# Patient Record
Sex: MALE | Race: WHITE | URBAN - METROPOLITAN AREA
[De-identification: names, ages, dates, MRNs, and addresses within clinical notes are randomized per-mention and may not be internally consistent; named-entity substitution may affect disease eponyms.]

---

## 2022-03-10 ENCOUNTER — OFFICE VISIT (OUTPATIENT)
Dept: PRIMARY CARE CLINIC | Age: 57
End: 2022-03-10
Payer: COMMERCIAL

## 2022-03-10 VITALS
HEIGHT: 69 IN | WEIGHT: 213 LBS | SYSTOLIC BLOOD PRESSURE: 138 MMHG | HEART RATE: 83 BPM | DIASTOLIC BLOOD PRESSURE: 86 MMHG | BODY MASS INDEX: 31.55 KG/M2 | TEMPERATURE: 98.1 F

## 2022-03-10 DIAGNOSIS — E11.69 DIABETES MELLITUS TYPE 2 IN OBESE (HCC): ICD-10-CM

## 2022-03-10 DIAGNOSIS — R53.83 FATIGUE, UNSPECIFIED TYPE: ICD-10-CM

## 2022-03-10 DIAGNOSIS — K20.90 BARRETT'S ESOPHAGUS WITH ESOPHAGITIS: ICD-10-CM

## 2022-03-10 DIAGNOSIS — E66.9 DIABETES MELLITUS TYPE 2 IN OBESE (HCC): ICD-10-CM

## 2022-03-10 DIAGNOSIS — Z00.00 ENCOUNTER FOR WELL ADULT EXAM WITHOUT ABNORMAL FINDINGS: Primary | ICD-10-CM

## 2022-03-10 DIAGNOSIS — K22.70 BARRETT'S ESOPHAGUS WITH ESOPHAGITIS: ICD-10-CM

## 2022-03-10 DIAGNOSIS — Z71.89 ACP (ADVANCE CARE PLANNING): ICD-10-CM

## 2022-03-10 PROBLEM — E78.1 HYPERTRIGLYCERIDEMIA: Status: ACTIVE | Noted: 2022-03-10

## 2022-03-10 LAB
A/G RATIO: 2.5 (ref 1.1–2.2)
ALBUMIN SERPL-MCNC: 5.2 G/DL (ref 3.4–5)
ALP BLD-CCNC: 75 U/L (ref 40–129)
ALT SERPL-CCNC: 61 U/L (ref 10–40)
ANION GAP SERPL CALCULATED.3IONS-SCNC: 15 MMOL/L (ref 3–16)
AST SERPL-CCNC: 32 U/L (ref 15–37)
BILIRUB SERPL-MCNC: 0.3 MG/DL (ref 0–1)
BUN BLDV-MCNC: 15 MG/DL (ref 7–20)
CALCIUM SERPL-MCNC: 10.4 MG/DL (ref 8.3–10.6)
CHLORIDE BLD-SCNC: 101 MMOL/L (ref 99–110)
CHOLESTEROL, TOTAL: 174 MG/DL (ref 0–199)
CO2: 24 MMOL/L (ref 21–32)
CREAT SERPL-MCNC: 0.8 MG/DL (ref 0.9–1.3)
CREATININE URINE: 211.9 MG/DL (ref 39–259)
GFR AFRICAN AMERICAN: >60
GFR NON-AFRICAN AMERICAN: >60
GLUCOSE BLD-MCNC: 202 MG/DL (ref 70–99)
HBA1C MFR BLD: 7.6 %
HBA1C MFR BLD: 7.6 %
HDLC SERPL-MCNC: 35 MG/DL (ref 40–60)
LDL CHOLESTEROL CALCULATED: ABNORMAL MG/DL
LDL CHOLESTEROL DIRECT: 91 MG/DL
MICROALBUMIN UR-MCNC: 1.7 MG/DL
MICROALBUMIN/CREAT UR-RTO: 8 MG/G (ref 0–30)
POTASSIUM SERPL-SCNC: 4.4 MMOL/L (ref 3.5–5.1)
SODIUM BLD-SCNC: 140 MMOL/L (ref 136–145)
TOTAL PROTEIN: 7.3 G/DL (ref 6.4–8.2)
TRIGL SERPL-MCNC: 452 MG/DL (ref 0–150)
VLDLC SERPL CALC-MCNC: ABNORMAL MG/DL

## 2022-03-10 PROCEDURE — 83036 HEMOGLOBIN GLYCOSYLATED A1C: CPT | Performed by: FAMILY MEDICINE

## 2022-03-10 PROCEDURE — 99386 PREV VISIT NEW AGE 40-64: CPT | Performed by: FAMILY MEDICINE

## 2022-03-10 PROCEDURE — 99204 OFFICE O/P NEW MOD 45 MIN: CPT | Performed by: FAMILY MEDICINE

## 2022-03-10 RX ORDER — OMEGA-3-ACID ETHYL ESTERS 1 G/1
CAPSULE, LIQUID FILLED ORAL
Qty: 360 CAPSULE | Refills: 3 | Status: SHIPPED | OUTPATIENT
Start: 2022-03-10 | End: 2022-10-04 | Stop reason: SDUPTHER

## 2022-03-10 RX ORDER — FENOFIBRATE 134 MG/1
CAPSULE ORAL
COMMUNITY
Start: 2022-01-18

## 2022-03-10 RX ORDER — CYCLOBENZAPRINE HCL 10 MG
20 TABLET ORAL 3 TIMES DAILY PRN
COMMUNITY

## 2022-03-10 RX ORDER — ALPRAZOLAM 0.5 MG/1
0.5 TABLET ORAL 3 TIMES DAILY PRN
COMMUNITY

## 2022-03-10 RX ORDER — OMEGA-3-ACID ETHYL ESTERS 1 G/1
CAPSULE, LIQUID FILLED ORAL
COMMUNITY
Start: 2022-01-17 | End: 2022-03-10 | Stop reason: SDUPTHER

## 2022-03-10 RX ORDER — LANSOPRAZOLE 30 MG/1
CAPSULE, DELAYED RELEASE ORAL
COMMUNITY
Start: 2022-03-01

## 2022-03-10 ASSESSMENT — PATIENT HEALTH QUESTIONNAIRE - PHQ9
SUM OF ALL RESPONSES TO PHQ QUESTIONS 1-9: 1
SUM OF ALL RESPONSES TO PHQ QUESTIONS 1-9: 1
SUM OF ALL RESPONSES TO PHQ9 QUESTIONS 1 & 2: 1
SUM OF ALL RESPONSES TO PHQ QUESTIONS 1-9: 1
SUM OF ALL RESPONSES TO PHQ QUESTIONS 1-9: 1
2. FEELING DOWN, DEPRESSED OR HOPELESS: 0
1. LITTLE INTEREST OR PLEASURE IN DOING THINGS: 1

## 2022-03-10 NOTE — PATIENT INSTRUCTIONS
Well Visit, Men 48 to 72: Care Instructions  Overview     Well visits can help you stay healthy. Your doctor has checked your overall health and may have suggested ways to take good care of yourself. Your doctor also may have recommended tests. At home, you can help prevent illness with healthy eating, regular exercise, and other steps. Follow-up care is a key part of your treatment and safety. Be sure to make and go to all appointments, and call your doctor if you are having problems. It's also a good idea to know your test results and keep a list of the medicines you take. How can you care for yourself at home? · Get screening tests that you and your doctor decide on. Screening helps find diseases before any symptoms appear. · Eat healthy foods. Choose fruits, vegetables, whole grains, protein, and low-fat dairy foods. Limit fat, especially saturated fat. Reduce salt in your diet. · Limit alcohol. Have no more than 2 drinks a day or 14 drinks a week. · Get at least 30 minutes of exercise on most days of the week. Walking is a good choice. You also may want to do other activities, such as running, swimming, cycling, or playing tennis or team sports. · Reach and stay at a healthy weight. This will lower your risk for many problems, such as obesity, diabetes, heart disease, and high blood pressure. · Do not smoke. Smoking can make health problems worse. If you need help quitting, talk to your doctor about stop-smoking programs and medicines. These can increase your chances of quitting for good. · Care for your mental health. It is easy to get weighed down by worry and stress. Learn strategies to manage stress, like deep breathing and mindfulness, and stay connected with your family and community. If you find you often feel sad or hopeless, talk with your doctor. Treatment can help. · Talk to your doctor about whether you have any risk factors for sexually transmitted infections (STIs).  You can help prevent STIs if you wait to have sex with a new partner (or partners) until you've each been tested for STIs. It also helps if you use condoms (male or female condoms) and if you limit your sex partners to one person who only has sex with you. Vaccines are available for some STIs. · If it's important to you to prevent pregnancy with your partner, talk with your doctor about birth control options that might be best for you. · If you think you may have a problem with alcohol or drug use, talk to your doctor. This includes prescription medicines (such as amphetamines and opioids) and illegal drugs (such as cocaine and methamphetamine). Your doctor can help you figure out what type of treatment is best for you. · Protect your skin from too much sun. When you're outdoors from 10 a.m. to 4 p.m., stay in the shade or cover up with clothing and a hat with a wide brim. Wear sunglasses that block UV rays. Even when it's cloudy, put broad-spectrum sunscreen (SPF 30 or higher) on any exposed skin. · See a dentist one or two times a year for checkups and to have your teeth cleaned. · Wear a seat belt in the car. When should you call for help? Watch closely for changes in your health, and be sure to contact your doctor if you have any problems or symptoms that concern you. Where can you learn more? Go to https://chlenoeb.[facility]. org and sign in to your [employer] account. Enter B978 in the [facility] box to learn more about \"Well Visit, Men 48 to 72: Care Instructions. \"     If you do not have an account, please click on the \"Sign Up Now\" link. Current as of: October 6, 2021               Content Version: 13.1  © 5848-4469 Healthwise, Incorporated. Care instructions adapted under license by [facility] ([facility]).  If you have questions about a medical condition or this instruction, always ask your healthcare professional. [address]  any warranty or liability for your use of this information. WELL ADULT LIFESTYLE INSTRUCTIONS:    Neema Lucero a day in the next week to spend an hour reviewing the information below then:     1) determine your health goals for the year   2) determine what changes you need to achieve those goals   3) design your daily routine, shopping habits etc to implement those changes        Default Right Action (no choices)       Make it EASY to do the RIGHT THINGS. 4) I invite you to send me your plans via [employer] so I can continue to help you       with them    Examine your lifestyle with an emphasis on BARRIERS to bad and good habits and how you can design your life to make better choices. If you want to feel better these are the FUNDAMENTAL PILLARS of Wellness:    1)  You can choose to Get 150 min/week of moderate exercise (can talk but can't        sing) or 75 min/week of vigorous exercise (can't talk). This will enhance your sense of well being (Exercise is as good as medicine for   depression.)    2)  You can choose to Get 7-9 hours of sleep per night    Detoxifies your brain, reduces risk of dementia    3)  You can choose to Strength Train 2 x a week on non-consecutive days   This will improve function and reduce risk of injury. Body weight type exercises   such as Yoga and Pilates are excellent choices. 4)  You can choose Good Nutrition. Only eat your goal weight (in lbs) x 10        calories/day and get 5 servings of Vegetables/day   Plant based diets reduce risk of heart attack/stroke and will help you feel full on   less food. Avoid highly processed foods and processed carbohydrates. 5)  You can choose Moderate alcohol intake < 1-2 drinks/day   Alcohol will disrupt your sleep and add calories to your day. 6)  You can choose to Develop a Charismatic/Supportive relationship. This will strengthen your resilience for the ups and downs. 7)  You can choose to Practice Mindfulness.      An hour a day of prayer/meditation/gratitude will change your life! If you are trying to lose weight, here are some recommendations for weight loss:  Not every weight loss program is appropriate for everybody. ..  good online sources include Noom (more social with daily check ins), Lifesum (similar but less social) and Naturally slim, as well as Brandneu ($1500)    The GI Diet or \"Primal diet\", Intermittent fasting can also be effective choices. If you have diabetes treated with insulin be sure to ask for specific guidance around meals. Take your desired weight in pounds and multiply by 10 and that is your average daily calorie allowance. For example if you wish to weigh 170 lb x 10 = 1700 gita/day (this is how to gradually lose the weight and maintain your desired weight). Avoid soda/coke and all \"wet carbs\" => Drink ice water instead    Drink a large glass of ice water before meals and EAT SLOWLY (talk while you eat)! Rethink your hunger => it means your losing weight.     Minimize highly processed carbohydrates as they stimulate your appetite:  Specifically cut back on Bread, Rice, Pasta and Potatoes    Avoid eating calories after 6 pm

## 2022-03-10 NOTE — PROGRESS NOTES
Well Adult Note  Name: Omi Patterson Date: 3/10/2022   MRN: <[id number]> Sex: Male   Age: 61 y.o. Ethnicity: Non- / Non    : 1965 Race: White (non-)      Kaaren Riding is here for well adult exam.  History:  Well adult exam, diabetes, fatigue and Harris's esophagus. Shonda Rogers notes that he does not do much exercise. He gets 7 hours of sleep a night. He drinks about 10 alcoholic beverages scattered over the week and eats about 2 servings of vegetables a day. He recently moved here from [facility] and is working at [employer] as a .    He tells me he has a history of diabetes. Would like to get tested for this. He is taking Metformin without side effect. He reports he has a history of Harris's esophagus. Tells me his last EGD was . He has been taking Prevacid twice a day for this. He notes he does snore and he has some evening fatigue and difficulty staying awake. Review of Systems    No Known Allergies    Prior to Visit Medications    Medication Sig Taking? Authorizing Provider   fenofibrate micronized (LOFIBRA) 134 MG capsule   Historical Provider, MD   metFORMIN (GLUCOPHAGE) 500 MG tablet Take 500 mg by mouth 2 times daily (with meals)  Historical Provider, MD   lansoprazole (PREVACID) 30 MG delayed release capsule   Historical Provider, MD   cyclobenzaprine (FLEXERIL) 10 MG tablet Take 20 mg by mouth 3 times daily as needed  Historical Provider, MD   omega-3 acid ethyl esters (LOVAZA) 1 g capsule TAKE 2 CAPSULES BY MOUTH TWICE DAILY  Historical Provider, MD   ALPRAZolam (XANAX) 0.5 MG tablet Take 0.5 mg by mouth 3 times daily as needed.   Historical Provider, MD       Past Medical History:   Diagnosis Date    Allergic rhinitis     Harris's esophagus with esophagitis     Diabetes mellitus type 2 in obese ([address]     Hypertriglyceridemia        Past Surgical History:   Procedure Laterality Date    CHOLECYSTECTOMY  2009    FOOT FRACTURE SURGERY  HERNIA REPAIR  2015       Family History   Problem Relation Age of Onset    No Known Problems Mother     Other Father 39        heart disease    Thyroid Disease Sister        Social History     Tobacco Use    Smoking status: Former Smoker     Packs/day: 1.00     Years: 25.00     Pack years: 25.00     Quit date:      Years since quittin.1    Smokeless tobacco: Never Used   Vaping Use    Vaping Use: Never used   Substance Use Topics    Alcohol use: Yes     Alcohol/week: 10.0 standard drinks     Types: 10 Standard drinks or equivalent per week     Comment: a beer a night    Drug use: Yes     Types: Marijuana Gelene Chasten)     Comment: occasionally       Objective   /86 (Site: Left Upper Arm, Position: Sitting, Cuff Size: Large Adult)   Pulse 83   Temp 98.1 °F (36.7 °C) (Temporal)   Ht 5' 8.75\" (1.746 m)   Wt 213 lb (96.6 kg)   BMI 31.68 kg/m²   Wt Readings from Last 3 Encounters:   03/10/22 213 lb (96.6 kg)     There were no vitals filed for this visit. Physical Exam  Constitutional:       Appearance: He is well-developed. HENT:      Head: Normocephalic and atraumatic. Nose: Nose normal.      Mouth/Throat:      Pharynx: No oropharyngeal exudate. Comments: Class IV airway  Eyes:      General: No scleral icterus. Right eye: No discharge. Left eye: No discharge. Pupils: Pupils are equal, round, and reactive to light. Neck:      Thyroid: No thyromegaly. Comments: 18 inch neck  Cardiovascular:      Rate and Rhythm: Normal rate and regular rhythm. Pulses:           Dorsalis pedis pulses are 2+ on the right side and 2+ on the left side. Posterior tibial pulses are 2+ on the right side and 2+ on the left side. Heart sounds: Normal heart sounds. No murmur heard. No friction rub. No gallop. Comments: No Edema Lower Extremities  Pulmonary:      Effort: Pulmonary effort is normal.      Breath sounds: Normal breath sounds.  No wheezing or rales.   Abdominal:      General: Bowel sounds are normal. There is no distension. Palpations: Abdomen is soft. There is no hepatomegaly or splenomegaly. Tenderness: There is no abdominal tenderness. There is no guarding or rebound. Musculoskeletal:         General: No tenderness or deformity. Normal range of motion. Cervical back: Normal range of motion and neck supple. Lymphadenopathy:      Cervical: No cervical adenopathy. Skin:     General: Skin is warm and dry. Findings: No erythema or rash. Neurological:      Mental Status: He is alert. Cranial Nerves: No cranial nerve deficit. Sensory: No sensory deficit. Gait: Gait normal.      Comments: Foot Exam: Skin warm, dry and intact w/o callus or erythema. Sensation intact to 10gm monofilament     Psychiatric:         Speech: Speech normal.         Behavior: Behavior normal.           Assessment   Plan   1. ACP (advance care planning)  Counseled patient on living will and healthcare power of . Gave him form for [facility] ADVANCED CARE PLAN FACE TO [address] [facility], [address] [[id number]]    2. Encounter for well adult exam without abnormal findings  Appears well:  Counselled diet, development, anticipatory guidance and safety issues. 3. Diabetes mellitus type 2 in obese (HCC)  Hemoglobin A1c was 7.6 today. This is marginal control. Counseled patient on diet and exercise and continue meds. Recheck 3 months  - omega-3 acid ethyl esters (LOVAZA) 1 g capsule; TAKE 2 CAPSULES BY MOUTH TWICE DAILY  Dispense: 360 capsule; Refill: 3  - Lipid Panel; Future  - Comprehensive Metabolic Panel; Future  - POCT glycosylated hemoglobin (Hb A1C)  - Microalbumin / Creatinine Urine Ratio; Future  -  DIABETES FOOT EXAM    4. Harris's esophagus with esophagitis  We will consult Dr. Craig Fink for evaluation. He will continue his PPI therapy.   We are looking to confirm the diagnosis of Harris's esophagus  - Amb External Referral To Gastroenterology    5. Fatigue, unspecified type  Concerning for sleep apnea. We will refer to Dr. Mary Arguelles for evaluation  - Tosha Grant MD, Sleep Medicine, [facility]      Personalized Preventive Plan   Current Health Maintenance Status    There is no immunization history on file for this patient. Health Maintenance   Topic Date Due    Hepatitis C screen  Never done    COVID-19 Vaccine (1) Never done    Depression Screen  Never done    HIV screen  Never done    DTaP/Tdap/Td vaccine (1 - Tdap) Never done    Diabetes screen  Never done    Lipid screen  Never done    Colorectal Cancer Screen  Never done    Shingles Vaccine (1 of 2) Never done    Low dose CT lung screening  Never done    Flu vaccine (1) Never done    Hepatitis A vaccine  Aged Out    Hepatitis B vaccine  Aged Out    Hib vaccine  Aged Out    Meningococcal (ACWY) vaccine  Aged Out    Pneumococcal 0-64 years Vaccine  Aged Out     Recommendations for [place]ee[place]ee[place] Due: see orders and patient instructions/AVS.    Return in 3 months (on 6/10/2022). Advance Care Planning   Advanced Care Planning: Discussed the patients choices for care and treatment in case of a health event that adversely affects decision-making abilities. Also discussed the patients long-term treatment options. Reviewed with the patient the appropriate state-specific advance directive documents. Reviewed the process of designating a competent adult as an Agent (or -in-fact) that could take make health care decisions for the patient if incompetent. Patient was asked to complete the declaration forms, either acknowledge the forms by a public notary or an eligible witness and provide a signed copy to the practice office.   Time spent (minutes): 3 minutes

## 2022-03-14 ENCOUNTER — TELEPHONE (OUTPATIENT)
Dept: ORTHOPEDIC SURGERY | Age: 57
End: 2022-03-14

## 2022-03-14 NOTE — TELEPHONE ENCOUNTER
PA submitted via [facility] for Omega-3-acid Ethyl Esters 1GM capsules.   Key: [id number] - Rx #: [id number]    STATUS: AWAITING CLINICAL QUESTIONS

## 2022-05-09 NOTE — TELEPHONE ENCOUNTER
metFORMIN (GLUCOPHAGE) 500 MG tablet     [address] [facility] [phone]   [address] [place] [address] Austyn Whitt 56 [address]   Phone:  [phone]  Fax:  [phone]      Pt needs refill and would like a 90 day supply.

## 2022-05-09 NOTE — TELEPHONE ENCOUNTER
Medication:   Requested Prescriptions     Pending Prescriptions Disp Refills    metFORMIN (GLUCOPHAGE) 500 MG tablet 60 tablet      Sig: Take 1 tablet by mouth 2 times daily (with meals)       Last Filled:  Reported 03/10/22  Last appt: 3/10/2022   Next appt: 6/13/2022    Last Labs DM:   Lab Results   Component Value Date    LABA1C 7.6 03/10/2022

## 2022-06-13 ENCOUNTER — OFFICE VISIT (OUTPATIENT)
Dept: PRIMARY CARE CLINIC | Age: 57
End: 2022-06-13
Payer: COMMERCIAL

## 2022-06-13 VITALS
TEMPERATURE: 97.6 F | BODY MASS INDEX: 30.79 KG/M2 | DIASTOLIC BLOOD PRESSURE: 82 MMHG | HEART RATE: 76 BPM | SYSTOLIC BLOOD PRESSURE: 129 MMHG | WEIGHT: 207 LBS | OXYGEN SATURATION: 92 %

## 2022-06-13 DIAGNOSIS — L57.0 ACTINIC KERATOSIS: ICD-10-CM

## 2022-06-13 DIAGNOSIS — E11.69 DIABETES MELLITUS TYPE 2 IN OBESE (HCC): Primary | ICD-10-CM

## 2022-06-13 DIAGNOSIS — F41.9 ANXIETY: ICD-10-CM

## 2022-06-13 DIAGNOSIS — K22.70 BARRETT'S ESOPHAGUS WITH ESOPHAGITIS: ICD-10-CM

## 2022-06-13 DIAGNOSIS — R53.83 FATIGUE, UNSPECIFIED TYPE: ICD-10-CM

## 2022-06-13 DIAGNOSIS — E66.9 DIABETES MELLITUS TYPE 2 IN OBESE (HCC): Primary | ICD-10-CM

## 2022-06-13 DIAGNOSIS — K20.90 BARRETT'S ESOPHAGUS WITH ESOPHAGITIS: ICD-10-CM

## 2022-06-13 PROCEDURE — 3051F HG A1C>EQUAL 7.0%<8.0%: CPT | Performed by: FAMILY MEDICINE

## 2022-06-13 PROCEDURE — 96127 BRIEF EMOTIONAL/BEHAV ASSMT: CPT | Performed by: FAMILY MEDICINE

## 2022-06-13 PROCEDURE — 17000 DESTRUCT PREMALG LESION: CPT | Performed by: FAMILY MEDICINE

## 2022-06-13 PROCEDURE — 99214 OFFICE O/P EST MOD 30 MIN: CPT | Performed by: FAMILY MEDICINE

## 2022-06-13 RX ORDER — CITALOPRAM 40 MG/1
40 TABLET ORAL DAILY
Qty: 90 TABLET | Refills: 1 | Status: SHIPPED | OUTPATIENT
Start: 2022-06-13

## 2022-06-13 SDOH — ECONOMIC STABILITY: FOOD INSECURITY: WITHIN THE PAST 12 MONTHS, YOU WORRIED THAT YOUR FOOD WOULD RUN OUT BEFORE YOU GOT MONEY TO BUY MORE.: NEVER TRUE

## 2022-06-13 SDOH — ECONOMIC STABILITY: FOOD INSECURITY: WITHIN THE PAST 12 MONTHS, THE FOOD YOU BOUGHT JUST DIDN'T LAST AND YOU DIDN'T HAVE MONEY TO GET MORE.: NEVER TRUE

## 2022-06-13 ASSESSMENT — ANXIETY QUESTIONNAIRES
2. NOT BEING ABLE TO STOP OR CONTROL WORRYING: 0
6. BECOMING EASILY ANNOYED OR IRRITABLE: 0
4. TROUBLE RELAXING: 1
GAD7 TOTAL SCORE: 1
1. FEELING NERVOUS, ANXIOUS, OR ON EDGE: 0
IF YOU CHECKED OFF ANY PROBLEMS ON THIS QUESTIONNAIRE, HOW DIFFICULT HAVE THESE PROBLEMS MADE IT FOR YOU TO DO YOUR WORK, TAKE CARE OF THINGS AT HOME, OR GET ALONG WITH OTHER PEOPLE: NOT DIFFICULT AT ALL
7. FEELING AFRAID AS IF SOMETHING AWFUL MIGHT HAPPEN: 0
5. BEING SO RESTLESS THAT IT IS HARD TO SIT STILL: 0
3. WORRYING TOO MUCH ABOUT DIFFERENT THINGS: 0

## 2022-06-13 ASSESSMENT — SOCIAL DETERMINANTS OF HEALTH (SDOH): HOW HARD IS IT FOR YOU TO PAY FOR THE VERY BASICS LIKE FOOD, HOUSING, MEDICAL CARE, AND HEATING?: NOT HARD AT ALL

## 2022-06-13 NOTE — PATIENT INSTRUCTIONS
Carolina Marques 1999, [address] [facility] [address]  [place] [address]  Phone: [phone]  Fax: [phone]\"    [address]  Sleep Medicine - Mary Jacobsen MD   [place] [address] [place] [address]   Phone: [phone]

## 2022-06-13 NOTE — PROGRESS NOTES
Chief Complaint   Patient presents with    Diabetes    Fatigue       HPI: Dl Lopes  presents for evaluation and management of diabetes, varus esophagus, fatigue and skin lesion. He notes he is taking and tolerating his diabetes medications without side effects. He is working on some weight loss and has managed to affect a 6 pound weight loss in the last 3 months. He is taking and tolerating his medicine for his Harris's. Notes no reflux or heartburn. Denies dysphagia. He has not followed up with Dr. Familia Shah for repeat EGD and possible biopsy. He notes his fatigue is a little bit better. He notes if he gets to bed early enough he wakes up feeling rested usually. He has an occasional headache in the morning. He did not ask his wife if he stops breathing while sleeping. He also notes a scaly lesion on the right side of his face he would like me to look at           Review of Systems    No Known Allergies  New Prescriptions    CITALOPRAM (CELEXA) 40 MG TABLET    Take 1 tablet by mouth daily     Current Outpatient Medications   Medication Sig Dispense Refill    citalopram (CELEXA) 40 MG tablet Take 1 tablet by mouth daily 90 tablet 1    metFORMIN (GLUCOPHAGE) 500 MG tablet Take 1 tablet by mouth 2 times daily (with meals) 180 tablet 0    fenofibrate micronized (LOFIBRA) 134 MG capsule       lansoprazole (PREVACID) 30 MG delayed release capsule       ALPRAZolam (XANAX) 0.5 MG tablet Take 0.5 mg by mouth 3 times daily as needed.  omega-3 acid ethyl esters (LOVAZA) 1 g capsule TAKE 2 CAPSULES BY MOUTH TWICE DAILY 360 capsule 3    cyclobenzaprine (FLEXERIL) 10 MG tablet Take 20 mg by mouth 3 times daily as needed (Patient not taking: Reported on 6/13/2022)       No current facility-administered medications for this visit.        Past Medical History:   Diagnosis Date    Allergic rhinitis     Harris's esophagus with esophagitis     Diabetes mellitus type 2 in obese ([address]     Hypertriglyceridemia          Objective   /82   Pulse 76   Temp 97.6 °F (36.4 °C)   Wt 207 lb (93.9 kg)   SpO2 92%   BMI 30.79 kg/m²   Wt Readings from Last 3 Encounters:   06/13/22 207 lb (93.9 kg)   03/10/22 213 lb (96.6 kg)       Physical Exam  Constitutional:       Appearance: He is well-developed. Cardiovascular:      Rate and Rhythm: Normal rate and regular rhythm. Heart sounds: No murmur heard. No friction rub. No gallop. Pulmonary:      Effort: Pulmonary effort is normal.      Breath sounds: Normal breath sounds. No wheezing or rales. Abdominal:      General: Bowel sounds are normal. There is no distension. Palpations: Abdomen is soft. There is no mass. Tenderness: There is no abdominal tenderness. Skin:     General: Skin is warm and dry. Findings: No rash. Comments: Hyperkeratotic scaly 5 mm plaque like lesion on right cheek. There is no induration. No scab           Chemistry        Component Value Date/Time     03/10/2022 0828    K 4.4 03/10/2022 0828     03/10/2022 0828    CO2 24 03/10/2022 0828    BUN 15 03/10/2022 0828    CREATININE 0.8 (L) 03/10/2022 0828        Component Value Date/Time    CALCIUM 10.4 03/10/2022 0828    ALKPHOS 75 03/10/2022 0828    AST 32 03/10/2022 0828    ALT 61 (H) 03/10/2022 0828    BILITOT 0.3 03/10/2022 0828          No results found for: WBC, HGB, HCT, MCV, PLT  Lab Results   Component Value Date    LABA1C 7.6 03/10/2022     No results found for: EAG  Lab Results   Component Value Date    LABA1C 7.6 03/10/2022     No components found for: CHLPL  Lab Results   Component Value Date    TRIG 452 (H) 03/10/2022     Lab Results   Component Value Date    HDL 35 (L) 03/10/2022     Lab Results   Component Value Date    LDLCALC see below 03/10/2022     Lab Results   Component Value Date    LABVLDL see below 03/10/2022         Assessment   Plan   1.  Diabetes mellitus type 2 in obese ([address]  Marginal control: Praised patient's weight loss. Expect him to be moving in right direction. We will defer A1c screening today but recheck him in 3 months    2. Harris's esophagus with esophagitis  Counseled patient on importance of follow-up for EGD as Harris's is a precancerous disease    3. Fatigue, unspecified type  Encouraged patient to get sleep evaluation. He is deferring. Working on weight loss in the meantime which may help    4. Anxiety  Chronic problem. Well-controlled with Celexa. Follow-up in 3 months  - citalopram (CELEXA) 40 MG tablet; Take 1 tablet by mouth daily  Dispense: 90 tablet; Refill: 1  - [place] BEHAV ASSMT W/SCORE & DOCD/STAND INSTRUMENT    5. Actinic keratosis  After discussing risks benefits and options, destroyed with liquid nitrogen. Follow-up 3 months  - [address] - [place] DESTRUC PREMALIGNANT, FIRST LESION        RTC Return in about 3 months (around 9/13/2022).

## 2022-08-25 NOTE — TELEPHONE ENCOUNTER
Medication:   Requested Prescriptions     Pending Prescriptions Disp Refills    metFORMIN (GLUCOPHAGE) 500 MG tablet 180 tablet 0     Sig: Take 1 tablet by mouth 2 times daily (with meals)        Last Filled:  5/9/22     Patient Phone Number: [phone] (home)     Last appt: 6/13/2022   Next appt: 9/13/2022    Last OARRS: No flowsheet data found.

## 2022-08-29 ENCOUNTER — TELEPHONE (OUTPATIENT)
Dept: PRIMARY CARE CLINIC | Age: 57
End: 2022-08-29

## 2022-08-29 DIAGNOSIS — E66.9 DIABETES MELLITUS TYPE 2 IN OBESE (HCC): Primary | ICD-10-CM

## 2022-08-29 DIAGNOSIS — E78.1 HYPERTRIGLYCERIDEMIA: ICD-10-CM

## 2022-08-29 DIAGNOSIS — E11.69 DIABETES MELLITUS TYPE 2 IN OBESE (HCC): Primary | ICD-10-CM

## 2022-08-29 NOTE — TELEPHONE ENCOUNTER
----- Message from Emily Hernandez sent at 8/29/2022  1:34 PM EDT -----  Subject: Referral Request    Reason for referral request? Labs  Provider patient wants to be referred to(if known):     Provider Phone Number(if known): Additional Information for Provider? Braden Freeman is schedule for appt   with PCP on 9/13/22. He would like to be able to complete his labs by this   Friday 9/2/22 because he will be out of town.  Please f/u with him as soon   as possible.  ---------------------------------------------------------------------------  --------------  Clayton BEJARANO    [phone]; OK to leave message on voicemail, OK to respond with   electronic message via [employer] portal (only for patients who have   registered [employer] account)  ---------------------------------------------------------------------------  --------------

## 2022-09-02 DIAGNOSIS — E66.9 DIABETES MELLITUS TYPE 2 IN OBESE (HCC): ICD-10-CM

## 2022-09-02 DIAGNOSIS — E11.69 DIABETES MELLITUS TYPE 2 IN OBESE (HCC): ICD-10-CM

## 2022-09-02 DIAGNOSIS — E78.1 HYPERTRIGLYCERIDEMIA: ICD-10-CM

## 2022-09-02 LAB
A/G RATIO: 1.9 (ref 1.1–2.2)
ALBUMIN SERPL-MCNC: 4.8 G/DL (ref 3.4–5)
ALP BLD-CCNC: 90 U/L (ref 40–129)
ALT SERPL-CCNC: 58 U/L (ref 10–40)
ANION GAP SERPL CALCULATED.3IONS-SCNC: 15 MMOL/L (ref 3–16)
AST SERPL-CCNC: 33 U/L (ref 15–37)
BILIRUB SERPL-MCNC: 0.3 MG/DL (ref 0–1)
BUN BLDV-MCNC: 13 MG/DL (ref 7–20)
CALCIUM SERPL-MCNC: 10.1 MG/DL (ref 8.3–10.6)
CHLORIDE BLD-SCNC: 99 MMOL/L (ref 99–110)
CHOLESTEROL, TOTAL: 173 MG/DL (ref 0–199)
CO2: 24 MMOL/L (ref 21–32)
CREAT SERPL-MCNC: 0.8 MG/DL (ref 0.9–1.3)
GFR AFRICAN AMERICAN: >60
GFR NON-AFRICAN AMERICAN: >60
GLUCOSE BLD-MCNC: 284 MG/DL (ref 70–99)
HDLC SERPL-MCNC: 31 MG/DL (ref 40–60)
LDL CHOLESTEROL CALCULATED: ABNORMAL MG/DL
LDL CHOLESTEROL DIRECT: 82 MG/DL
POTASSIUM SERPL-SCNC: 4.6 MMOL/L (ref 3.5–5.1)
SODIUM BLD-SCNC: 138 MMOL/L (ref 136–145)
TOTAL PROTEIN: 7.3 G/DL (ref 6.4–8.2)
TRIGL SERPL-MCNC: 612 MG/DL (ref 0–150)
VLDLC SERPL CALC-MCNC: ABNORMAL MG/DL

## 2022-09-03 LAB
ESTIMATED AVERAGE GLUCOSE: 217.3 MG/DL
HBA1C MFR BLD: 9.2 %

## 2022-09-06 NOTE — RESULT ENCOUNTER NOTE
Good Morning Jamie Tran ,    Thank you for getting your labs drawn in preparation for our visit. Your lab results are back and it appears your diabetes control has slipped a little bit. We will be addressing this when you come in.       I look forward to seeing you in a few days,    Dr. Hipolito Johns      For your convenience I have listed your future appointment(s) below:  Future Appointments  9/13/2022  9:15 AM    MD RADHA Nolan

## 2022-09-13 ENCOUNTER — OFFICE VISIT (OUTPATIENT)
Dept: PRIMARY CARE CLINIC | Age: 57
End: 2022-09-13
Payer: COMMERCIAL

## 2022-09-13 VITALS
HEART RATE: 91 BPM | WEIGHT: 204 LBS | DIASTOLIC BLOOD PRESSURE: 88 MMHG | HEIGHT: 68 IN | OXYGEN SATURATION: 98 % | SYSTOLIC BLOOD PRESSURE: 136 MMHG | TEMPERATURE: 97.7 F | BODY MASS INDEX: 30.92 KG/M2

## 2022-09-13 DIAGNOSIS — E11.69 DIABETES MELLITUS TYPE 2 IN OBESE (HCC): ICD-10-CM

## 2022-09-13 DIAGNOSIS — Z12.11 SCREEN FOR COLON CANCER: ICD-10-CM

## 2022-09-13 DIAGNOSIS — E78.1 HYPERTRIGLYCERIDEMIA: ICD-10-CM

## 2022-09-13 DIAGNOSIS — Z00.00 ENCOUNTER FOR WELL ADULT EXAM WITHOUT ABNORMAL FINDINGS: ICD-10-CM

## 2022-09-13 DIAGNOSIS — E66.9 DIABETES MELLITUS TYPE 2 IN OBESE (HCC): ICD-10-CM

## 2022-09-13 DIAGNOSIS — K20.90 BARRETT'S ESOPHAGUS WITH ESOPHAGITIS: ICD-10-CM

## 2022-09-13 DIAGNOSIS — Z00.00 WELL ADULT EXAM: Primary | ICD-10-CM

## 2022-09-13 DIAGNOSIS — K22.70 BARRETT'S ESOPHAGUS WITH ESOPHAGITIS: ICD-10-CM

## 2022-09-13 PROCEDURE — 99396 PREV VISIT EST AGE 40-64: CPT | Performed by: FAMILY MEDICINE

## 2022-09-13 PROCEDURE — 99214 OFFICE O/P EST MOD 30 MIN: CPT | Performed by: FAMILY MEDICINE

## 2022-09-13 RX ORDER — ATORVASTATIN CALCIUM 40 MG/1
40 TABLET, FILM COATED ORAL DAILY
Qty: 90 TABLET | OUTPATIENT
Start: 2022-09-13

## 2022-09-13 RX ORDER — ATORVASTATIN CALCIUM 40 MG/1
40 TABLET, FILM COATED ORAL DAILY
Qty: 30 TABLET | Refills: 3 | Status: SHIPPED | OUTPATIENT
Start: 2022-09-13

## 2022-09-13 ASSESSMENT — PATIENT HEALTH QUESTIONNAIRE - PHQ9
SUM OF ALL RESPONSES TO PHQ QUESTIONS 1-9: 0
2. FEELING DOWN, DEPRESSED OR HOPELESS: 0
1. LITTLE INTEREST OR PLEASURE IN DOING THINGS: 0
SUM OF ALL RESPONSES TO PHQ QUESTIONS 1-9: 0
SUM OF ALL RESPONSES TO PHQ9 QUESTIONS 1 & 2: 0

## 2022-09-13 NOTE — PROGRESS NOTES
Well Adult Note  Name: Марина Dubois Date: 2022   MRN: [id number] Sex: Male   Age: 61 y.o. Ethnicity: Non- / Non    : 1965 Race: White (non-)      Angella Camacho is here for well adult exam.  History:  He notes he does not do any formal exercises but is currently rehabbing a house. He gets about 7 hours of sleep at night. He eats about 1 serving of alcohol a day and 2-3 servings of vegetables a day. He notes he has been taking and tolerating his metformin for his diabetes. Admits he has been drinking vitamin water for about 200 gita a day that he thinks is coming up his blood sugar. He is taking and tolerating his fish oil and fenofibrate for his triglycerides. He has yet to follow-up with Dr. Christian Dixon for his colonoscopy or his EGD for his history of Harris's esophagus. He is compliant with his Prevacid for that      Review of Systems    No Known Allergies      Prior to Visit Medications    Medication Sig Taking? Authorizing Provider   metFORMIN (GLUCOPHAGE) 500 MG tablet Take 1 tablet by mouth 2 times daily (with meals) Yes Arabella Vargas MD   fenofibrate micronized (LOFIBRA) 134 MG capsule  Yes Historical Provider, MD   lansoprazole (PREVACID) 30 MG delayed release capsule  Yes Historical Provider, MD   ALPRAZolam Hussein Paci) 0.5 MG tablet Take 0.5 mg by mouth 3 times daily as needed.  Yes Historical Provider, MD   omega-3 acid ethyl esters (LOVAZA) 1 g capsule TAKE 2 CAPSULES BY MOUTH TWICE DAILY Yes Arabella Vargas MD   citalopram (CELEXA) 40 MG tablet Take 1 tablet by mouth daily  Patient not taking: Reported on 2022  Arabella Vargas MD   cyclobenzaprine (FLEXERIL) 10 MG tablet Take 20 mg by mouth 3 times daily as needed  Patient not taking: No sig reported  Historical Provider, MD         Past Medical History:   Diagnosis Date    Allergic rhinitis     Harris's esophagus with esophagitis     Diabetes mellitus type 2 in obese ([address] Hypertriglyceridemia        Past Surgical History:   Procedure Laterality Date    CHOLECYSTECTOMY  2009    FOOT FRACTURE SURGERY      HERNIA REPAIR  2015         Family History   Problem Relation Age of Onset    No Known Problems Mother     Other Father 39        heart disease    Thyroid Disease Sister        Social History     Tobacco Use    Smoking status: Former     Packs/day: 1.00     Years: 25.00     Pack years: 25.00     Types: Cigarettes     Quit date:      Years since quittin.7    Smokeless tobacco: Never   Vaping Use    Vaping Use: Never used   Substance Use Topics    Alcohol use: Yes     Alcohol/week: 10.0 standard drinks     Types: 10 Standard drinks or equivalent per week     Comment: a beer a night    Drug use: Yes     Types: Marijuana (Weed)     Comment: occasionally       Objective   /88   Pulse 91   Temp 97.7 °F (36.5 °C)   Ht 5' 8\" (1.727 m)   Wt 204 lb (92.5 kg)   SpO2 98%   BMI 31.02 kg/m²   Wt Readings from Last 3 Encounters:   22 204 lb (92.5 kg)   22 207 lb (93.9 kg)   03/10/22 213 lb (96.6 kg)     There were no vitals filed for this visit. Physical Exam  Constitutional:       Appearance: He is well-developed. HENT:      Head: Normocephalic and atraumatic. Nose: Nose normal.      Mouth/Throat:      Pharynx: No oropharyngeal exudate. Eyes:      General: No scleral icterus. Right eye: No discharge. Left eye: No discharge. Pupils: Pupils are equal, round, and reactive to light. Neck:      Thyroid: No thyromegaly. Cardiovascular:      Rate and Rhythm: Normal rate and regular rhythm. Pulses:           Dorsalis pedis pulses are 2+ on the right side and 2+ on the left side. Posterior tibial pulses are 2+ on the right side and 2+ on the left side. Heart sounds: Normal heart sounds. No murmur heard. No friction rub. No gallop.       Comments: No Edema Lower Extremities  Pulmonary:      Effort: Pulmonary effort is normal.      Breath sounds: Normal breath sounds. No wheezing or rales. Abdominal:      General: Bowel sounds are normal. There is no distension. Palpations: Abdomen is soft. There is no hepatomegaly or splenomegaly. Tenderness: There is no abdominal tenderness. There is no guarding or rebound. Musculoskeletal:         General: No tenderness or deformity. Normal range of motion. Cervical back: Normal range of motion and neck supple. Lymphadenopathy:      Cervical: No cervical adenopathy. Skin:     General: Skin is warm and dry. Findings: No erythema or rash. Neurological:      Mental Status: He is alert. Cranial Nerves: No cranial nerve deficit. Sensory: No sensory deficit. Gait: Gait normal.   Psychiatric:         Speech: Speech normal.         Behavior: Behavior normal.         Assessment   Plan   1. Well adult exam  Appears well:  Counselled diet, development, anticipatory guidance and safety issues. 2. Diabetes mellitus type 2 in obese [facility])  Uncontrolled: Counseled patient on working on diet and exercise to manage his blood sugars in addition to medications. He will cut out the refined sugars and we will follow-up in 3 months    3. Hypertriglyceridemia  Uncontrolled: Counseled patient on risk of pancreatitis. We will continue treating with current meds and add on Lipitor daily. 4. Harris's esophagus with esophagitis  Continue meds and encourage patient to follow-up with Dr. Dio Jose for this and his colonoscopy    5. Encounter for well adult exam without abnormal findings  As above      Personalized Preventive Plan   Current Health Maintenance Status    There is no immunization history on file for this patient.      Health Maintenance   Topic Date Due    COVID-19 Vaccine (1) Never done    Pneumococcal 0-64 years Vaccine (1 - PCV) Never done    HIV screen  Never done    Diabetic retinal exam  Never done    Hepatitis C screen  Never done    Hepatitis B vaccine (1 of 3 - Risk 3-dose series) Never done    DTaP/Tdap/Td vaccine (1 - Tdap) Never done    Colorectal Cancer Screen  Never done    Low dose CT lung screening  Never done    Flu vaccine (1) Never done    Shingles vaccine (2 of 2) 10/19/2022    A1C test (Diabetic or Prediabetic)  12/02/2022    Diabetic foot exam  03/10/2023    Diabetic microalbuminuria test  03/10/2023    Depression Screen  03/10/2023    Lipids  09/02/2023    Hepatitis A vaccine  Aged Out    Hib vaccine  Aged Out    Meningococcal (ACWY) vaccine  Aged Out     Recommendations for CRMnext Due: see orders and patient instructions/AVS.    No follow-ups on file. Advance Care Planning   Advanced Care Planning: Discussed the patients choices for care and treatment in case of a health event that adversely affects decision-making abilities. Also discussed the patients long-term treatment options. Reviewed with the patient the appropriate state-specific advance directive documents. Reviewed the process of designating a competent adult as an Agent (or -in-fact) that could take make health care decisions for the patient if incompetent. Patient was asked to complete the declaration forms, either acknowledge the forms by a public notary or an eligible witness and provide a signed copy to the practice office.   Time spent (minutes): 2 minutes

## 2022-09-13 NOTE — PATIENT INSTRUCTIONS
Niacin    Send me your Completed Living Will and/or [facility]  documents:  [address] MD  [address] [facility], [address], [address]  Ph: [phone]  Fax: [address] [facility] [place], [address] [address]  Phone: [phone]  Fax: [phone]\"    WELL ADULT LIFESTYLE INSTRUCTIONS:    Bryanna Castillo a day in the next week to spend an hour reviewing the information below then:     1) determine your health goals for the year   2) determine what changes you need to achieve those goals   3) design your daily routine, shopping habits etc to implement those changes        Default Right Action (no choices)       Make it EASY to do the RIGHT THINGS. 4) I invite you to send me your plans via [employer] so I can continue to help you       with them    Examine your lifestyle with an emphasis on BARRIERS to bad and good habits and how you can design your life to make better choices. If you want to feel better these are the FUNDAMENTAL PILLARS of Wellness:    1)  You can choose to Get 150 min/week of moderate exercise (can talk but can't        sing) or 75 min/week of vigorous exercise (can't talk). This will enhance your sense of well being (Exercise is as good as medicine for   depression.)    2)  You can choose to Get 7-9 hours of sleep per night    Detoxifies your brain, reduces risk of dementia    3)  You can choose to Strength Train 2 x a week on non-consecutive days   This will improve function and reduce risk of injury. Body weight type exercises   such as Yoga and Pilates are excellent choices. 4)  You can choose Good Nutrition. Only eat your goal weight (in lbs) x 10        calories/day and get 5 servings of Vegetables/day   Plant based diets reduce risk of heart attack/stroke and will help you feel full on   less food.    Avoid highly processed foods and processed carbohydrates. 5)  You can choose Moderate alcohol intake < 1-2 drinks/day   Alcohol will disrupt your sleep and add calories to your day. 6)  You can choose to Develop a Charismatic/Supportive relationship. This will strengthen your resilience for the ups and downs. 7)  You can choose to Practice Mindfulness. An hour a day of prayer/meditation/gratitude will change your life! If you are trying to lose weight, here are some recommendations for weight loss:  Not every weight loss program is appropriate for everybody. ..  good online sources include [employer] (more social with daily check ins), Marley Spoon (similar but less social) and Naturally slim, as well as [employer] ($1500)    The GI Diet or \"Primal diet\", Intermittent fasting can also be effective choices. If you have diabetes treated with insulin be sure to ask for specific guidance around meals. Take your desired weight in pounds and multiply by 10 and that is your average daily calorie allowance. For example if you wish to weigh 170 lb x 10 = 1700 gita/day (this is how to gradually lose the weight and maintain your desired weight). Avoid soda/coke and all \"wet carbs\" => Drink ice water instead    Drink a large glass of ice water before meals and EAT SLOWLY (talk while you eat)! Rethink your hunger => it means your losing weight.     Minimize highly processed carbohydrates as they stimulate your appetite:  Specifically cut back on Bread, Rice, Pasta and Potatoes    Avoid eating calories after 6 pm

## 2022-10-04 DIAGNOSIS — E11.69 DIABETES MELLITUS TYPE 2 IN OBESE (HCC): ICD-10-CM

## 2022-10-04 DIAGNOSIS — E66.9 DIABETES MELLITUS TYPE 2 IN OBESE (HCC): ICD-10-CM

## 2022-10-04 NOTE — TELEPHONE ENCOUNTER
Medication:   Requested Prescriptions     Pending Prescriptions Disp Refills    omega-3 acid ethyl esters (LOVAZA) 1 g capsule 360 capsule 3     Sig: TAKE 2 CAPSULES BY MOUTH TWICE DAILY        Last Filled:  03/10/22    Patient Phone Number: [phone] (home)     Last appt: 9/13/2022   Next appt: 12/13/2022    Last OARRS: No flowsheet data found.

## 2022-10-05 RX ORDER — OMEGA-3-ACID ETHYL ESTERS 1 G/1
CAPSULE, LIQUID FILLED ORAL
Qty: 360 CAPSULE | Refills: 3 | Status: SHIPPED | OUTPATIENT
Start: 2022-10-05

## 2022-11-07 ENCOUNTER — OFFICE VISIT (OUTPATIENT)
Dept: PRIMARY CARE CLINIC | Age: 57
End: 2022-11-07
Payer: COMMERCIAL

## 2022-11-07 VITALS
HEIGHT: 68 IN | DIASTOLIC BLOOD PRESSURE: 70 MMHG | WEIGHT: 200.8 LBS | SYSTOLIC BLOOD PRESSURE: 122 MMHG | BODY MASS INDEX: 30.43 KG/M2 | OXYGEN SATURATION: 98 % | TEMPERATURE: 97.2 F | HEART RATE: 78 BPM

## 2022-11-07 DIAGNOSIS — E11.69 DIABETES MELLITUS TYPE 2 IN OBESE (HCC): ICD-10-CM

## 2022-11-07 DIAGNOSIS — R73.09 BLOOD SUGAR INCREASED: Primary | ICD-10-CM

## 2022-11-07 DIAGNOSIS — K22.70 BARRETT'S ESOPHAGUS WITH ESOPHAGITIS: ICD-10-CM

## 2022-11-07 DIAGNOSIS — E78.1 HYPERTRIGLYCERIDEMIA: ICD-10-CM

## 2022-11-07 DIAGNOSIS — K20.90 BARRETT'S ESOPHAGUS WITH ESOPHAGITIS: ICD-10-CM

## 2022-11-07 DIAGNOSIS — E66.9 DIABETES MELLITUS TYPE 2 IN OBESE (HCC): ICD-10-CM

## 2022-11-07 LAB — HBA1C MFR BLD: 10.2 %

## 2022-11-07 PROCEDURE — 90677 PCV20 VACCINE IM: CPT | Performed by: FAMILY MEDICINE

## 2022-11-07 PROCEDURE — 99214 OFFICE O/P EST MOD 30 MIN: CPT | Performed by: FAMILY MEDICINE

## 2022-11-07 PROCEDURE — 3046F HEMOGLOBIN A1C LEVEL >9.0%: CPT | Performed by: FAMILY MEDICINE

## 2022-11-07 PROCEDURE — 90471 IMMUNIZATION ADMIN: CPT | Performed by: FAMILY MEDICINE

## 2022-11-07 PROCEDURE — 90472 IMMUNIZATION ADMIN EACH ADD: CPT | Performed by: FAMILY MEDICINE

## 2022-11-07 PROCEDURE — 83036 HEMOGLOBIN GLYCOSYLATED A1C: CPT | Performed by: FAMILY MEDICINE

## 2022-11-07 PROCEDURE — 90756 CCIIV4 VACC ABX FREE IM: CPT | Performed by: FAMILY MEDICINE

## 2022-11-07 RX ORDER — DULAGLUTIDE 0.75 MG/.5ML
0.75 INJECTION, SOLUTION SUBCUTANEOUS WEEKLY
Qty: 4 ADJUSTABLE DOSE PRE-FILLED PEN SYRINGE | Refills: 1 | Status: SHIPPED | OUTPATIENT
Start: 2022-11-07

## 2022-11-07 NOTE — PROGRESS NOTES
Chief Complaint   Patient presents with    Blood Sugar Problem       HPI: Nadia Collins  presents for evaluation and management of diabetes and elevated triglycerides. Shona Knapp notes that his blood sugars have been running higher. He has only been taking his metformin. Tells me that his blood sugars are running as high as 400 in the morning. He notes increased thirst and increased urination but denies blurred vision. Notes no significant shortness of breath or dry mouth. He quit taking his Lipitor because he thought it was making his blood sugars run a little bit higher. Denies muscle pain or abdominal pain. He did get his EGD and colonoscopy done on October 28. Plan for 5-year follow-up pending biopsy results               Review of Systems    No Known Allergies  New Prescriptions    No medications on file     Current Outpatient Medications   Medication Sig Dispense Refill    omega-3 acid ethyl esters (LOVAZA) 1 g capsule TAKE 2 CAPSULES BY MOUTH TWICE DAILY 360 capsule 3    metFORMIN (GLUCOPHAGE) 500 MG tablet Take 1 tablet by mouth 2 times daily (with meals) 180 tablet 0    fenofibrate micronized (LOFIBRA) 134 MG capsule       lansoprazole (PREVACID) 30 MG delayed release capsule       ALPRAZolam (XANAX) 0.5 MG tablet Take 0.5 mg by mouth 3 times daily as needed. sAXagliptin-metFORMIN ER 5-500 MG TB24       atorvastatin (LIPITOR) 40 MG tablet Take 1 tablet by mouth daily (Patient not taking: Reported on 11/7/2022) 30 tablet 3    citalopram (CELEXA) 40 MG tablet Take 1 tablet by mouth daily (Patient not taking: No sig reported) 90 tablet 1    cyclobenzaprine (FLEXERIL) 10 MG tablet Take 20 mg by mouth 3 times daily as needed (Patient not taking: No sig reported)       No current facility-administered medications for this visit.        Past Medical History:   Diagnosis Date    Allergic rhinitis     Harris's esophagus with esophagitis     Diabetes mellitus type 2 in obese [facility])     Hypertriglyceridemia Objective   /70   Pulse 78   Temp 97.2 °F (36.2 °C)   Ht 5' 8\" (1.727 m)   Wt 200 lb 12.8 oz (91.1 kg)   SpO2 98%   BMI 30.53 kg/m²   Wt Readings from Last 3 Encounters:   11/07/22 200 lb 12.8 oz (91.1 kg)   09/13/22 204 lb (92.5 kg)   06/13/22 207 lb (93.9 kg)       Physical Exam  Constitutional:       Appearance: He is well-developed. Cardiovascular:      Rate and Rhythm: Normal rate and regular rhythm. Heart sounds: No murmur heard. No friction rub. No gallop. Pulmonary:      Effort: Pulmonary effort is normal.      Breath sounds: Normal breath sounds. No wheezing or rales. Abdominal:      General: Bowel sounds are normal. There is no distension. Palpations: Abdomen is soft. There is no mass. Tenderness: There is no abdominal tenderness. Skin:     General: Skin is warm and dry. Findings: No rash.          Chemistry        Component Value Date/Time     09/02/2022 0740    K 4.6 09/02/2022 0740    CL 99 09/02/2022 0740    CO2 24 09/02/2022 0740    BUN 13 09/02/2022 0740    CREATININE 0.8 (L) 09/02/2022 0740        Component Value Date/Time    CALCIUM 10.1 09/02/2022 0740    ALKPHOS 90 09/02/2022 0740    AST 33 09/02/2022 0740    ALT 58 (H) 09/02/2022 0740    BILITOT 0.3 09/02/2022 0740          No results found for: WBC, HGB, HCT, MCV, PLT  Lab Results   Component Value Date    LABA1C 9.2 09/02/2022     Lab Results   Component Value Date    .3 09/02/2022     Lab Results   Component Value Date    LABA1C 9.2 09/02/2022     No components found for: CHLPL  Lab Results   Component Value Date    TRIG 612 (H) 09/02/2022    TRIG 452 (H) 03/10/2022     Lab Results   Component Value Date    HDL 31 (L) 09/02/2022    HDL 35 (L) 03/10/2022     Lab Results   Component Value Date    LDLCALC see below 09/02/2022    LDLCALC see below 03/10/2022     Lab Results   Component Value Date    LABVLDL see below 09/02/2022    LABVLDL see below 03/10/2022         Assessment   Plan 1. Blood sugar increased  Hemoglobin A1c was 10.2 today  - POCT glycosylated hemoglobin (Hb A1C)    2. Diabetes mellitus type 2 in obese [facility])  Uncontrolled despite use of metformin. Concerned he may be developing late onset type 1 diabetes. We will check markers for type 1 diabetes and CMP. Follow-up in 1 month. In the meantime we will start him on Trulicity  - Glutamic Acid Decarboxylase; Future  - Anti-Islet Cell Antibody; Future  - C-Peptide; Future  - Zinc Transporter 8 AB; Future  - Comprehensive Metabolic Panel; Future  - Dulaglutide (TRULICITY) 8.55 MC/3.5GR SOPN; Inject 0.75 mg into the skin once a week  Dispense: 4 Adjustable Dose Pre-filled Pen Syringe; Refill: 1    3. Hypertriglyceridemia  Counseled to restart his Lipitor. Follow-up 1 month  - Comprehensive Metabolic Panel; Future    4. Harris's esophagus with esophagitis  Patient underwent EGD. Biopsies taken. Surgical pathology pending      RTC Return in about 4 weeks (around 12/5/2022).

## 2022-11-07 NOTE — PATIENT INSTRUCTIONS
[facility] Laboratory Locations - No appointment necessary. @ indicates the location is open Saturdays in addition to Monday through Friday. Call your preferred location for test preparation, business hours and other information you need. [employer] accepts [employer]. [facility]    @ [employer] [facility] [address] [facility] Tulio Gupta. [place], [address]   Ph: [phone] [facility] [address]   Ph: [phone]  @ [facility] [address] [facility]   Ph: [phone]    [facility] [address] [facility] [place], [address]   Ph: [phone] @ [place] [facility] [address]  Ph: [phone] @ [facility] [address] [facility]. [facility]   Ph: [phone]    [place]   @ [place] [facility] [address] [place], [place] [address]   Ph: [phone] [place] [facility] [address] [place] [address]  Ph: [address]:  [address] Lab Svcs[address] [facility]   Ph: [phone] [facility]. Lab Svcs.   [address]   Ph: [phone]

## 2022-11-08 DIAGNOSIS — E78.1 HYPERTRIGLYCERIDEMIA: ICD-10-CM

## 2022-11-08 DIAGNOSIS — E11.69 DIABETES MELLITUS TYPE 2 IN OBESE (HCC): ICD-10-CM

## 2022-11-08 DIAGNOSIS — E66.9 DIABETES MELLITUS TYPE 2 IN OBESE (HCC): ICD-10-CM

## 2022-11-08 LAB
A/G RATIO: 2.2 (ref 1.1–2.2)
ALBUMIN SERPL-MCNC: 4.8 G/DL (ref 3.4–5)
ALP BLD-CCNC: 80 U/L (ref 40–129)
ALT SERPL-CCNC: 40 U/L (ref 10–40)
ANION GAP SERPL CALCULATED.3IONS-SCNC: 12 MMOL/L (ref 3–16)
AST SERPL-CCNC: 25 U/L (ref 15–37)
BILIRUB SERPL-MCNC: 0.5 MG/DL (ref 0–1)
BUN BLDV-MCNC: 15 MG/DL (ref 7–20)
CALCIUM SERPL-MCNC: 9.7 MG/DL (ref 8.3–10.6)
CHLORIDE BLD-SCNC: 100 MMOL/L (ref 99–110)
CO2: 23 MMOL/L (ref 21–32)
CREAT SERPL-MCNC: 0.8 MG/DL (ref 0.9–1.3)
GFR SERPL CREATININE-BSD FRML MDRD: >60 ML/MIN/{1.73_M2}
GLUCOSE BLD-MCNC: 278 MG/DL (ref 70–99)
POTASSIUM SERPL-SCNC: 4.3 MMOL/L (ref 3.5–5.1)
SODIUM BLD-SCNC: 135 MMOL/L (ref 136–145)
TOTAL PROTEIN: 7 G/DL (ref 6.4–8.2)

## 2022-11-10 LAB — C-PEPTIDE: 4.4 NG/ML (ref 1.1–4.4)

## 2022-11-11 LAB — ISLET CELL ANTIBODY: NORMAL

## 2022-11-12 LAB — GLUTAMIC ACID DECARB AB: <5 IU/ML (ref 0–5)

## 2022-11-13 LAB — ZINC TRANSPORTER 8 AB: <10 U/ML (ref 0–15)

## 2022-11-14 NOTE — RESULT ENCOUNTER NOTE
Good Morning Samira Mckeon ,    Thank you for getting your labs drawn. Your lab results are back and it appears you have Type 2 diabetes. This is great news as I think we can do much to keep you off insulin. Trulicity is a GREAT drug in this situation. Keep up the good work!     I look forward to seeing you in a couple weeks,    Dr. Bell Deal      For your convenience I have listed your future appointment(s) below:  Future Appointments  12/5/2022  8:15 AM    MD RADHA Barber

## 2022-12-05 ENCOUNTER — OFFICE VISIT (OUTPATIENT)
Dept: PRIMARY CARE CLINIC | Age: 57
End: 2022-12-05
Payer: COMMERCIAL

## 2022-12-05 VITALS
HEIGHT: 70 IN | TEMPERATURE: 97 F | BODY MASS INDEX: 28.63 KG/M2 | OXYGEN SATURATION: 97 % | WEIGHT: 200 LBS | DIASTOLIC BLOOD PRESSURE: 84 MMHG | HEART RATE: 76 BPM | SYSTOLIC BLOOD PRESSURE: 135 MMHG

## 2022-12-05 DIAGNOSIS — K20.90 BARRETT'S ESOPHAGUS WITH ESOPHAGITIS: ICD-10-CM

## 2022-12-05 DIAGNOSIS — E78.1 HYPERTRIGLYCERIDEMIA: ICD-10-CM

## 2022-12-05 DIAGNOSIS — K22.70 BARRETT'S ESOPHAGUS WITH ESOPHAGITIS: ICD-10-CM

## 2022-12-05 DIAGNOSIS — E11.69 DIABETES MELLITUS TYPE 2 IN OBESE (HCC): Primary | ICD-10-CM

## 2022-12-05 DIAGNOSIS — E66.9 DIABETES MELLITUS TYPE 2 IN OBESE (HCC): Primary | ICD-10-CM

## 2022-12-05 PROCEDURE — 3046F HEMOGLOBIN A1C LEVEL >9.0%: CPT | Performed by: FAMILY MEDICINE

## 2022-12-05 PROCEDURE — 99214 OFFICE O/P EST MOD 30 MIN: CPT | Performed by: FAMILY MEDICINE

## 2022-12-05 RX ORDER — LANSOPRAZOLE 30 MG/1
30 CAPSULE, DELAYED RELEASE ORAL 2 TIMES DAILY
Qty: 180 CAPSULE | Refills: 3 | Status: SHIPPED | OUTPATIENT
Start: 2022-12-05

## 2022-12-05 RX ORDER — ATORVASTATIN CALCIUM 40 MG/1
40 TABLET, FILM COATED ORAL DAILY
Qty: 90 TABLET | Refills: 3 | Status: SHIPPED | OUTPATIENT
Start: 2022-12-05

## 2022-12-05 RX ORDER — DULAGLUTIDE 1.5 MG/.5ML
1.5 INJECTION, SOLUTION SUBCUTANEOUS
Qty: 4 ADJUSTABLE DOSE PRE-FILLED PEN SYRINGE | Refills: 11 | Status: SHIPPED | OUTPATIENT
Start: 2022-12-05

## 2022-12-05 RX ORDER — FENOFIBRATE 134 MG/1
134 CAPSULE ORAL
Qty: 90 CAPSULE | Refills: 3 | Status: SHIPPED | OUTPATIENT
Start: 2022-12-05

## 2022-12-05 NOTE — PROGRESS NOTES
Chief Complaint   Patient presents with    Diabetes    Cholesterol Problem       HPI: Julio Singleton  presents for evaluation and management of diabetes, hypertriglyceridemia and Harris's esophagus. Nae Izquierdo notes he is feeling well. He has been taking and tolerating his Trulicity at 7.77 mg weekly dose without nausea or vomiting. Tells me his blood sugars are running about 150-200 in the morning and 150-200 in the afternoons. He notes he is taking and tolerating his Lipitor and [place]. Denies abdominal pain or muscle aches. He did get his EGD done in October per Dr. Hallie Simmons. Biopsies were taken however I do not have these at my disposal.  He is taking his Prevacid 30 mg twice daily           Review of Systems    No Known Allergies  New Prescriptions    DULAGLUTIDE (TRULICITY) 1.5 KW/8.9UW SC INJECTION    Inject 0.5 mLs into the skin every 7 days     Current Outpatient Medications   Medication Sig Dispense Refill    dulaglutide (TRULICITY) 1.5 XX/4.5QC SC injection Inject 0.5 mLs into the skin every 7 days 4 Adjustable Dose Pre-filled Pen Syringe 11    fenofibrate micronized (LOFIBRA) 134 MG capsule Take 1 capsule by mouth every morning (before breakfast) 90 capsule 3    lansoprazole (PREVACID) 30 MG delayed release capsule Take 1 capsule by mouth in the morning and at bedtime 180 capsule 3    atorvastatin (LIPITOR) 40 MG tablet Take 1 tablet by mouth daily 90 tablet 3    metFORMIN (GLUCOPHAGE) 500 MG tablet TAKE 1 TABLET BY MOUTH TWICE DAILY WITH MEALS 180 tablet 0    omega-3 acid ethyl esters (LOVAZA) 1 g capsule TAKE 2 CAPSULES BY MOUTH TWICE DAILY 360 capsule 3    ALPRAZolam (XANAX) 0.5 MG tablet Take 0.5 mg by mouth 3 times daily as needed.       citalopram (CELEXA) 40 MG tablet Take 1 tablet by mouth daily (Patient not taking: No sig reported) 90 tablet 1    cyclobenzaprine (FLEXERIL) 10 MG tablet Take 20 mg by mouth 3 times daily as needed (Patient not taking: No sig reported)       No current facility-administered medications for this visit. Past Medical History:   Diagnosis Date    Allergic rhinitis     Harris's esophagus with esophagitis     Diabetes mellitus type 2 in obese [facility])     Hypertriglyceridemia          Objective   /84   Pulse 76   Temp 97 °F (36.1 °C)   Ht 5' 10\" (1.778 m)   Wt 200 lb (90.7 kg)   SpO2 97%   BMI 28.70 kg/m²   Wt Readings from Last 3 Encounters:   12/05/22 200 lb (90.7 kg)   11/07/22 200 lb 12.8 oz (91.1 kg)   09/13/22 204 lb (92.5 kg)       Physical Exam  Constitutional:       Appearance: He is well-developed. Cardiovascular:      Rate and Rhythm: Normal rate and regular rhythm. Heart sounds: No murmur heard. No friction rub. No gallop. Pulmonary:      Effort: Pulmonary effort is normal.      Breath sounds: Normal breath sounds. No wheezing or rales. Abdominal:      General: Bowel sounds are normal. There is no distension. Palpations: Abdomen is soft. There is no mass. Tenderness: There is no abdominal tenderness. Skin:     General: Skin is warm and dry. Findings: No rash.          Chemistry        Component Value Date/Time     (L) 11/08/2022 0744    K 4.3 11/08/2022 0744     11/08/2022 0744    CO2 23 11/08/2022 0744    BUN 15 11/08/2022 0744    CREATININE 0.8 (L) 11/08/2022 0744        Component Value Date/Time    CALCIUM 9.7 11/08/2022 0744    ALKPHOS 80 11/08/2022 0744    AST 25 11/08/2022 0744    ALT 40 11/08/2022 0744    BILITOT 0.5 11/08/2022 0744          No results found for: WBC, HGB, HCT, MCV, PLT  Lab Results   Component Value Date    LABA1C 10.2 11/07/2022     Lab Results   Component Value Date    .3 09/02/2022     Lab Results   Component Value Date    LABA1C 10.2 11/07/2022     No components found for: CHLPL  Lab Results   Component Value Date    TRIG 612 (H) 09/02/2022    TRIG 452 (H) 03/10/2022     Lab Results   Component Value Date    HDL 31 (L) 09/02/2022    HDL 35 (L) 03/10/2022     Lab Results   Component Value Date    LDLCALC see below 09/02/2022    [address] see below 03/10/2022     Lab Results   Component Value Date    LABVLDL see below 09/02/2022    LABVLDL see below 03/10/2022         Assessment   Plan   1. Diabetes mellitus type 2 in obese (HCC)  Hemoglobin A1c was 9.7 today. This is significantly improved but not to goal.  We will titrate up his Trulicity to 1.5 mg weekly and follow-up in 2 months  - dulaglutide (TRULICITY) 1.5 XP/3.7FZ SC injection; Inject 0.5 mLs into the skin every 7 days  Dispense: 4 Adjustable Dose Pre-filled Pen Syringe; Refill: 11  - atorvastatin (LIPITOR) 40 MG tablet; Take 1 tablet by mouth daily  Dispense: 90 tablet; Refill: 3    2. Hypertriglyceridemia  Counseled patient we will not likely get his triglycerides under control until we get his diabetes under control first.  Recheck 2 months  - fenofibrate micronized (LOFIBRA) 134 MG capsule; Take 1 capsule by mouth every morning (before breakfast)  Dispense: 90 capsule; Refill: 3  - atorvastatin (LIPITOR) 40 MG tablet; Take 1 tablet by mouth daily  Dispense: 90 tablet; Refill: 3    3. Harris's esophagus with esophagitis  Taking Prevacid as directed. We will follow-up with Dr. Larson for his surgical pathology results. - lansoprazole (PREVACID) 30 MG delayed release capsule; Take 1 capsule by mouth in the morning and at bedtime  Dispense: 180 capsule; Refill: 3        RTC Return in about 2 years (around 12/5/2024).

## 2022-12-05 NOTE — Clinical Note
Good Morning Jimmy Mota had an EGD in October but I don't have his Surgical Path, would you send that to me?  Thanks, Feliciano Martin

## 2023-02-06 ENCOUNTER — OFFICE VISIT (OUTPATIENT)
Dept: PRIMARY CARE CLINIC | Age: 58
End: 2023-02-06
Payer: COMMERCIAL

## 2023-02-06 VITALS
HEART RATE: 81 BPM | SYSTOLIC BLOOD PRESSURE: 124 MMHG | WEIGHT: 201.8 LBS | TEMPERATURE: 98.1 F | DIASTOLIC BLOOD PRESSURE: 81 MMHG | HEIGHT: 68 IN | BODY MASS INDEX: 30.58 KG/M2

## 2023-02-06 DIAGNOSIS — J30.9 ALLERGIC RHINITIS, UNSPECIFIED SEASONALITY, UNSPECIFIED TRIGGER: ICD-10-CM

## 2023-02-06 DIAGNOSIS — E66.9 DIABETES MELLITUS TYPE 2 IN OBESE (HCC): Primary | ICD-10-CM

## 2023-02-06 DIAGNOSIS — K20.90 BARRETT'S ESOPHAGUS WITH ESOPHAGITIS: ICD-10-CM

## 2023-02-06 DIAGNOSIS — E11.69 DIABETES MELLITUS TYPE 2 IN OBESE (HCC): Primary | ICD-10-CM

## 2023-02-06 DIAGNOSIS — K22.70 BARRETT'S ESOPHAGUS WITH ESOPHAGITIS: ICD-10-CM

## 2023-02-06 DIAGNOSIS — E78.1 HYPERTRIGLYCERIDEMIA: ICD-10-CM

## 2023-02-06 LAB — HBA1C MFR BLD: 7.3 %

## 2023-02-06 PROCEDURE — 3051F HG A1C>EQUAL 7.0%<8.0%: CPT | Performed by: FAMILY MEDICINE

## 2023-02-06 PROCEDURE — 99214 OFFICE O/P EST MOD 30 MIN: CPT | Performed by: FAMILY MEDICINE

## 2023-02-06 PROCEDURE — 83036 HEMOGLOBIN GLYCOSYLATED A1C: CPT | Performed by: FAMILY MEDICINE

## 2023-02-06 RX ORDER — FLUTICASONE PROPIONATE 50 MCG
2 SPRAY, SUSPENSION (ML) NASAL DAILY
Qty: 48 G | Refills: 1 | Status: SHIPPED | OUTPATIENT
Start: 2023-02-06

## 2023-02-06 RX ORDER — OMEGA-3-ACID ETHYL ESTERS 1 G/1
CAPSULE, LIQUID FILLED ORAL
Qty: 360 CAPSULE | Refills: 3 | Status: SHIPPED | OUTPATIENT
Start: 2023-02-06

## 2023-02-06 RX ORDER — AZELASTINE 1 MG/ML
2 SPRAY, METERED NASAL 2 TIMES DAILY
Qty: 30 ML | Refills: 3 | Status: SHIPPED | OUTPATIENT
Start: 2023-02-06

## 2023-02-06 SDOH — ECONOMIC STABILITY: FOOD INSECURITY: WITHIN THE PAST 12 MONTHS, THE FOOD YOU BOUGHT JUST DIDN'T LAST AND YOU DIDN'T HAVE MONEY TO GET MORE.: NEVER TRUE

## 2023-02-06 SDOH — ECONOMIC STABILITY: FOOD INSECURITY: WITHIN THE PAST 12 MONTHS, YOU WORRIED THAT YOUR FOOD WOULD RUN OUT BEFORE YOU GOT MONEY TO BUY MORE.: NEVER TRUE

## 2023-02-06 SDOH — ECONOMIC STABILITY: INCOME INSECURITY: HOW HARD IS IT FOR YOU TO PAY FOR THE VERY BASICS LIKE FOOD, HOUSING, MEDICAL CARE, AND HEATING?: NOT HARD AT ALL

## 2023-02-06 SDOH — ECONOMIC STABILITY: HOUSING INSECURITY
IN THE LAST 12 MONTHS, WAS THERE A TIME WHEN YOU DID NOT HAVE A STEADY PLACE TO SLEEP OR SLEPT IN A SHELTER (INCLUDING NOW)?: NO

## 2023-02-06 ASSESSMENT — PATIENT HEALTH QUESTIONNAIRE - PHQ9
2. FEELING DOWN, DEPRESSED OR HOPELESS: 0
SUM OF ALL RESPONSES TO PHQ QUESTIONS 1-9: 0
1. LITTLE INTEREST OR PLEASURE IN DOING THINGS: 0
SUM OF ALL RESPONSES TO PHQ QUESTIONS 1-9: 0
SUM OF ALL RESPONSES TO PHQ9 QUESTIONS 1 & 2: 0

## 2023-02-06 NOTE — PROGRESS NOTES
Chief Complaint   Patient presents with    Diabetes       HPI: Bill Houser  presents for evaluation and management of diabetes, cholesterol, allergies. Elke Spann notes that he has been taking his Trulicity. He admits he has had a little bit of indigestion with that but has also been working on his diet and exercise and eating better foods to better control his diabetes. He notes his urination has decreased significantly denies anxiety tremulousness or confusion. He has been taking and tolerating his Lipitor and Lovaza. Notes no muscle aches on the medicine. He complains his allergies are really acting up with sinus congestion and runny nose. He has been using Flonase without benefit.     He did get his EGD and colonoscopy in October for his Harris's and is due for 5-year follow-up on these    Today's PHQ:    PHQ Scores 2/6/2023 9/13/2022 3/10/2022   PHQ2 Score 0 0 1   PHQ9 Score 0 0 1     Interpretation of Total Score Depression Severity: 1-4 = Minimal depression, 5-9 = Mild depression, 10-14 = Moderate depression, 15-19 = Moderately severe depression, 20-27 = Severe depression        Review of Systems    No Known Allergies  New Prescriptions    AZELASTINE (ASTELIN) 0.1 % NASAL SPRAY    2 sprays by Nasal route 2 times daily Use in each nostril as directed    FLUTICASONE (FLONASE) 50 MCG/ACT NASAL SPRAY    2 sprays by Each Nostril route daily     Current Outpatient Medications   Medication Sig Dispense Refill    metFORMIN (GLUCOPHAGE) 500 MG tablet TAKE 1 TABLET BY MOUTH TWICE DAILY WITH MEALS 180 tablet 3    omega-3 acid ethyl esters (LOVAZA) 1 g capsule TAKE 2 CAPSULES BY MOUTH TWICE DAILY 360 capsule 3    azelastine (ASTELIN) 0.1 % nasal spray 2 sprays by Nasal route 2 times daily Use in each nostril as directed 30 mL 3    fluticasone (FLONASE) 50 MCG/ACT nasal spray 2 sprays by Each Nostril route daily 48 g 1    dulaglutide (TRULICITY) 1.5 MX/9.5CC SC injection Inject 0.5 mLs into the skin every 7 days 4 Adjustable Dose Pre-filled Pen Syringe 11    fenofibrate micronized (LOFIBRA) 134 MG capsule Take 1 capsule by mouth every morning (before breakfast) 90 capsule 3    lansoprazole (PREVACID) 30 MG delayed release capsule Take 1 capsule by mouth in the morning and at bedtime 180 capsule 3    atorvastatin (LIPITOR) 40 MG tablet Take 1 tablet by mouth daily 90 tablet 3    ALPRAZolam (XANAX) 0.5 MG tablet Take 0.5 mg by mouth 3 times daily as needed. No current facility-administered medications for this visit. Past Medical History:   Diagnosis Date    Allergic rhinitis     Harris's esophagus with esophagitis     Diabetes mellitus type 2 in obese (HCC)     Hypertriglyceridemia          Objective   /81   Pulse 81   Temp 98.1 °F (36.7 °C) (Temporal)   Ht 5' 8\" (1.727 m)   Wt 201 lb 12.8 oz (91.5 kg)   BMI 30.68 kg/m²   Wt Readings from Last 3 Encounters:   02/06/23 201 lb 12.8 oz (91.5 kg)   12/05/22 200 lb (90.7 kg)   11/07/22 200 lb 12.8 oz (91.1 kg)       Physical Exam  Constitutional:       Appearance: Normal appearance. He is well-developed. HENT:      Right Ear: Tympanic membrane and ear canal normal.      Left Ear: Tympanic membrane and ear canal normal.      Nose: Congestion and rhinorrhea present. Mouth/Throat:      Pharynx: Uvula midline. Comments: Class IV airway  Eyes:      General: No scleral icterus. Conjunctiva/sclera: Conjunctivae normal.      Pupils: Pupils are equal, round, and reactive to light. Neck:      Thyroid: No thyromegaly. Cardiovascular:      Rate and Rhythm: Normal rate and regular rhythm. Pulses:           Dorsalis pedis pulses are 2+ on the right side and 2+ on the left side. Posterior tibial pulses are 2+ on the right side and 2+ on the left side. Heart sounds: Normal heart sounds. No murmur heard. No gallop.       Comments: no edema lower extremities  Pulmonary:      Effort: Pulmonary effort is normal.      Breath sounds: Normal breath sounds. No wheezing or rales. Abdominal:      General: Bowel sounds are normal. There is no distension. Palpations: Abdomen is soft. There is no mass. Tenderness: There is no abdominal tenderness. There is no rebound. Musculoskeletal:      Cervical back: Neck supple. Lymphadenopathy:      Cervical: No cervical adenopathy. Skin:     General: Skin is warm. Findings: No erythema or rash. Psychiatric:         Behavior: Behavior normal.         Thought Content: Thought content normal.         Judgment: Judgment normal.         Chemistry        Component Value Date/Time     (L) 11/08/2022 0744    K 4.3 11/08/2022 0744     11/08/2022 0744    CO2 23 11/08/2022 0744    BUN 15 11/08/2022 0744    CREATININE 0.8 (L) 11/08/2022 0744        Component Value Date/Time    CALCIUM 9.7 11/08/2022 0744    ALKPHOS 80 11/08/2022 0744    AST 25 11/08/2022 0744    ALT 40 11/08/2022 0744    BILITOT 0.5 11/08/2022 0744          No results found for: WBC, HGB, HCT, MCV, PLT  Lab Results   Component Value Date    LABA1C 7.3 02/06/2023     Lab Results   Component Value Date    .3 09/02/2022     Lab Results   Component Value Date    LABA1C 7.3 02/06/2023     No components found for: CHLPL  Lab Results   Component Value Date    TRIG 612 (H) 09/02/2022    TRIG 452 (H) 03/10/2022     Lab Results   Component Value Date    HDL 31 (L) 09/02/2022    HDL 35 (L) 03/10/2022     Lab Results   Component Value Date    LDLCALC see below 09/02/2022    LDLCALC see below 03/10/2022     Lab Results   Component Value Date    LABVLDL see below 09/02/2022    LABVLDL see below 03/10/2022         Assessment   Plan   1. Diabetes mellitus type 2 in obese (HCC)  Hemoglobin A1c was 7.3 today. This represents dramatic improvement. Continue meds and exercise. - POCT glycosylated hemoglobin (Hb A1C)  - metFORMIN (GLUCOPHAGE) 500 MG tablet; TAKE 1 TABLET BY MOUTH TWICE DAILY WITH MEALS  Dispense: 180 tablet;  Refill: 3  - omega-3 acid ethyl esters (LOVAZA) 1 g capsule; TAKE 2 CAPSULES BY MOUTH TWICE DAILY  Dispense: 360 capsule; Refill: 3    2. Hypertriglyceridemia  Doing well on current meds. We will recheck labs when patient returns as his diabetes being now controlled I expect his triglycerides to improve    3. Harris's esophagus with esophagitis  Stable: Continue medicine and surveillance EGD in 5 years    4. Allergic rhinitis, unspecified seasonality, unspecified trigger  Uncontrolled: We will add Astelin to his Flonase and follow-up with 3 to 6 months. Advised patient to have his wife observe him sleeping to see if he has apneic episodes  - azelastine (ASTELIN) 0.1 % nasal spray; 2 sprays by Nasal route 2 times daily Use in each nostril as directed  Dispense: 30 mL; Refill: 3  - fluticasone (FLONASE) 50 MCG/ACT nasal spray; 2 sprays by Each Nostril route daily  Dispense: 48 g; Refill: 1        RTC Return in about 6 months (around 8/6/2023).

## 2023-05-08 ENCOUNTER — OFFICE VISIT (OUTPATIENT)
Dept: PRIMARY CARE CLINIC | Age: 58
End: 2023-05-08

## 2023-05-08 VITALS
TEMPERATURE: 97.6 F | SYSTOLIC BLOOD PRESSURE: 125 MMHG | HEIGHT: 68 IN | HEART RATE: 80 BPM | WEIGHT: 191 LBS | OXYGEN SATURATION: 97 % | DIASTOLIC BLOOD PRESSURE: 74 MMHG | BODY MASS INDEX: 28.95 KG/M2

## 2023-05-08 DIAGNOSIS — E66.9 DIABETES MELLITUS TYPE 2 IN OBESE (HCC): ICD-10-CM

## 2023-05-08 DIAGNOSIS — E78.1 HYPERTRIGLYCERIDEMIA: Primary | ICD-10-CM

## 2023-05-08 DIAGNOSIS — E11.69 DIABETES MELLITUS TYPE 2 IN OBESE (HCC): ICD-10-CM

## 2023-05-08 DIAGNOSIS — J30.9 ALLERGIC RHINITIS, UNSPECIFIED SEASONALITY, UNSPECIFIED TRIGGER: ICD-10-CM

## 2023-05-08 DIAGNOSIS — E78.1 HYPERTRIGLYCERIDEMIA: ICD-10-CM

## 2023-05-08 PROCEDURE — 99214 OFFICE O/P EST MOD 30 MIN: CPT | Performed by: FAMILY MEDICINE

## 2023-05-08 PROCEDURE — 3051F HG A1C>EQUAL 7.0%<8.0%: CPT | Performed by: FAMILY MEDICINE

## 2023-05-08 NOTE — PROGRESS NOTES
Chief Complaint   Patient presents with    Diabetes     A1C follow up    Allergies     Having allergies early in the season, Started to experience Vertigo and is wondering if it has to do with allergies. Has had a few dizzy spells. HPI: Valente Mancera  presents for evaluation and management of follow-up on multiple medical problems. He has a history of high triglycerides. He reports that his diabetes has been much better controlled running between 110 and 130 for blood sugars. He has also lost significant weight and is down another 10 pounds on the Trulicity. He has been compliant with his diabetes and cholesterol and lipid medication. He notes his allergies are significantly improved. He tells me he has some increased dizziness and the Epley maneuver is not helping. He reports that his snoring has improved significantly as has his sleep with the weight loss.   He does not think he is having apneic episodes           Review of Systems    No Known Allergies  New Prescriptions    No medications on file     Current Outpatient Medications   Medication Sig Dispense Refill    metFORMIN (GLUCOPHAGE) 500 MG tablet TAKE 1 TABLET BY MOUTH TWICE DAILY WITH MEALS 180 tablet 3    omega-3 acid ethyl esters (LOVAZA) 1 g capsule TAKE 2 CAPSULES BY MOUTH TWICE DAILY 360 capsule 3    azelastine (ASTELIN) 0.1 % nasal spray 2 sprays by Nasal route 2 times daily Use in each nostril as directed 30 mL 3    fluticasone (FLONASE) 50 MCG/ACT nasal spray 2 sprays by Each Nostril route daily 48 g 1    dulaglutide (TRULICITY) 1.5 KS/3.3KN SC injection Inject 0.5 mLs into the skin every 7 days 4 Adjustable Dose Pre-filled Pen Syringe 11    fenofibrate micronized (LOFIBRA) 134 MG capsule Take 1 capsule by mouth every morning (before breakfast) 90 capsule 3    lansoprazole (PREVACID) 30 MG delayed release capsule Take 1 capsule by mouth in the morning and at bedtime 180 capsule 3    atorvastatin (LIPITOR) 40 MG tablet Take 1 tablet by

## 2023-05-09 LAB
ALBUMIN SERPL-MCNC: 4.9 G/DL (ref 3.4–5)
ALBUMIN/GLOB SERPL: 2.2 {RATIO} (ref 1.1–2.2)
ALP SERPL-CCNC: 51 U/L (ref 40–129)
ALT SERPL-CCNC: 47 U/L (ref 10–40)
ANION GAP SERPL CALCULATED.3IONS-SCNC: 11 MMOL/L (ref 3–16)
AST SERPL-CCNC: 30 U/L (ref 15–37)
BILIRUB SERPL-MCNC: 0.4 MG/DL (ref 0–1)
BUN SERPL-MCNC: 14 MG/DL (ref 7–20)
CALCIUM SERPL-MCNC: 10.7 MG/DL (ref 8.3–10.6)
CHLORIDE SERPL-SCNC: 104 MMOL/L (ref 99–110)
CHOLEST SERPL-MCNC: 103 MG/DL (ref 0–199)
CO2 SERPL-SCNC: 25 MMOL/L (ref 21–32)
CREAT SERPL-MCNC: 0.8 MG/DL (ref 0.9–1.3)
GFR SERPLBLD CREATININE-BSD FMLA CKD-EPI: >60 ML/MIN/{1.73_M2}
GLUCOSE SERPL-MCNC: 106 MG/DL (ref 70–99)
HDLC SERPL-MCNC: 47 MG/DL (ref 40–60)
LDLC SERPL CALC-MCNC: 30 MG/DL
POTASSIUM SERPL-SCNC: 4.6 MMOL/L (ref 3.5–5.1)
PROT SERPL-MCNC: 7.1 G/DL (ref 6.4–8.2)
SODIUM SERPL-SCNC: 140 MMOL/L (ref 136–145)
TRIGL SERPL-MCNC: 130 MG/DL (ref 0–150)
VLDLC SERPL CALC-MCNC: 26 MG/DL

## 2023-05-10 DIAGNOSIS — E11.69 DIABETES MELLITUS TYPE 2 IN OBESE (HCC): ICD-10-CM

## 2023-05-10 DIAGNOSIS — E66.9 DIABETES MELLITUS TYPE 2 IN OBESE (HCC): ICD-10-CM

## 2023-05-10 LAB
CREAT UR-MCNC: 92.9 MG/DL (ref 39–259)
MICROALBUMIN UR DL<=1MG/L-MCNC: <1.2 MG/DL
MICROALBUMIN/CREAT UR: NORMAL MG/G (ref 0–30)

## 2023-05-11 DIAGNOSIS — J30.9 ALLERGIC RHINITIS, UNSPECIFIED SEASONALITY, UNSPECIFIED TRIGGER: ICD-10-CM

## 2023-05-11 RX ORDER — AZELASTINE HYDROCHLORIDE 137 UG/1
SPRAY, METERED NASAL
Qty: 30 ML | Refills: 3 | Status: SHIPPED | OUTPATIENT
Start: 2023-05-11

## 2023-05-11 NOTE — TELEPHONE ENCOUNTER
Medication:   Requested Prescriptions     Pending Prescriptions Disp Refills    Azelastine HCl 137 MCG/SPRAY SOLN [Pharmacy Med Name: AZELASTINE 0.1% (137 MCG) SPRY]  3     Sig: USE 2 SPRAYS IN EACH NOSTRIL TWICE A DAY AS DIRECTED        Last Filled:  Last refill: 4/17/2023    Patient Phone Number: [phone] (home)     Last appt: 5/8/2023   Next appt: Visit date not found    Last OARRS: No flowsheet data found.

## 2023-05-19 DIAGNOSIS — J30.9 ALLERGIC RHINITIS, UNSPECIFIED SEASONALITY, UNSPECIFIED TRIGGER: ICD-10-CM

## 2023-05-19 RX ORDER — AZELASTINE HYDROCHLORIDE 137 UG/1
2 SPRAY, METERED NASAL 2 TIMES DAILY
Qty: 90 ML | Refills: 3 | Status: SHIPPED | OUTPATIENT
Start: 2023-05-19

## 2023-05-19 NOTE — TELEPHONE ENCOUNTER
[address]      Medication:   Requested Prescriptions     Pending Prescriptions Disp Refills    Azelastine HCl 137 MCG/SPRAY SOLN 90 mL 3        Last Filled:  5/11/23    Patient Phone Number: [phone] (home)     Last appt: 5/8/2023   Next appt: 11/8/2023    Last OARRS: No flowsheet data found.

## 2023-08-08 ENCOUNTER — OFFICE VISIT (OUTPATIENT)
Dept: PRIMARY CARE CLINIC | Age: 58
End: 2023-08-08
Payer: COMMERCIAL

## 2023-08-08 ENCOUNTER — HOSPITAL ENCOUNTER (OUTPATIENT)
Dept: GENERAL RADIOLOGY | Age: 58
Discharge: HOME | End: 2023-08-08
Payer: COMMERCIAL

## 2023-08-08 VITALS
HEART RATE: 77 BPM | HEIGHT: 68 IN | SYSTOLIC BLOOD PRESSURE: 112 MMHG | TEMPERATURE: 97.3 F | BODY MASS INDEX: 29.55 KG/M2 | OXYGEN SATURATION: 96 % | DIASTOLIC BLOOD PRESSURE: 78 MMHG | WEIGHT: 195 LBS

## 2023-08-08 DIAGNOSIS — S46.912A STRAIN OF LEFT SHOULDER, INITIAL ENCOUNTER: ICD-10-CM

## 2023-08-08 DIAGNOSIS — F41.9 ANXIETY: Primary | ICD-10-CM

## 2023-08-08 PROCEDURE — 73030 X-RAY EXAM OF SHOULDER: CPT

## 2023-08-08 PROCEDURE — 99214 OFFICE O/P EST MOD 30 MIN: CPT | Performed by: STUDENT IN AN ORGANIZED HEALTH CARE EDUCATION/TRAINING PROGRAM

## 2023-08-08 RX ORDER — CITALOPRAM 40 MG/1
40 TABLET ORAL DAILY
Qty: 90 TABLET | Refills: 3 | Status: SHIPPED | OUTPATIENT
Start: 2023-08-08

## 2023-08-08 RX ORDER — MELOXICAM 15 MG/1
15 TABLET ORAL DAILY
Qty: 30 TABLET | Refills: 0 | Status: SHIPPED | OUTPATIENT
Start: 2023-08-08

## 2023-08-08 ASSESSMENT — ANXIETY QUESTIONNAIRES
IF YOU CHECKED OFF ANY PROBLEMS ON THIS QUESTIONNAIRE, HOW DIFFICULT HAVE THESE PROBLEMS MADE IT FOR YOU TO DO YOUR WORK, TAKE CARE OF THINGS AT HOME, OR GET ALONG WITH OTHER PEOPLE: NOT DIFFICULT AT ALL
3. WORRYING TOO MUCH ABOUT DIFFERENT THINGS: 0
5. BEING SO RESTLESS THAT IT IS HARD TO SIT STILL: 0
7. FEELING AFRAID AS IF SOMETHING AWFUL MIGHT HAPPEN: 0
1. FEELING NERVOUS, ANXIOUS, OR ON EDGE: 0
2. NOT BEING ABLE TO STOP OR CONTROL WORRYING: 0
6. BECOMING EASILY ANNOYED OR IRRITABLE: 0
GAD7 TOTAL SCORE: 0
4. TROUBLE RELAXING: 0

## 2023-08-08 ASSESSMENT — ENCOUNTER SYMPTOMS
DIARRHEA: 0
BACK PAIN: 0
CONSTIPATION: 0

## 2023-08-08 ASSESSMENT — PATIENT HEALTH QUESTIONNAIRE - PHQ9
SUM OF ALL RESPONSES TO PHQ QUESTIONS 1-9: 5
SUM OF ALL RESPONSES TO PHQ QUESTIONS 1-9: 5
9. THOUGHTS THAT YOU WOULD BE BETTER OFF DEAD, OR OF HURTING YOURSELF: 0
7. TROUBLE CONCENTRATING ON THINGS, SUCH AS READING THE NEWSPAPER OR WATCHING TELEVISION: 0
SUM OF ALL RESPONSES TO PHQ9 QUESTIONS 1 & 2: 0
2. FEELING DOWN, DEPRESSED OR HOPELESS: 0
4. FEELING TIRED OR HAVING LITTLE ENERGY: 2
1. LITTLE INTEREST OR PLEASURE IN DOING THINGS: 0
8. MOVING OR SPEAKING SO SLOWLY THAT OTHER PEOPLE COULD HAVE NOTICED. OR THE OPPOSITE, BEING SO FIGETY OR RESTLESS THAT YOU HAVE BEEN MOVING AROUND A LOT MORE THAN USUAL: 0
10. IF YOU CHECKED OFF ANY PROBLEMS, HOW DIFFICULT HAVE THESE PROBLEMS MADE IT FOR YOU TO DO YOUR WORK, TAKE CARE OF THINGS AT HOME, OR GET ALONG WITH OTHER PEOPLE: 1
5. POOR APPETITE OR OVEREATING: 0
SUM OF ALL RESPONSES TO PHQ QUESTIONS 1-9: 5
3. TROUBLE FALLING OR STAYING ASLEEP: 3
6. FEELING BAD ABOUT YOURSELF - OR THAT YOU ARE A FAILURE OR HAVE LET YOURSELF OR YOUR FAMILY DOWN: 0
SUM OF ALL RESPONSES TO PHQ QUESTIONS 1-9: 5

## 2023-08-08 NOTE — PROGRESS NOTES
[facility]  2023    Peter Lozada (:  1965) is a 60 y.o. male, here for evaluation of the following medical concerns:    Chief Complaint   Patient presents with    [facility] Setting     Former Dr. Jenifer Willams patient to establish care. Shoulder Pain     Pain in left shoulder. Started about a month ago. Does not recall an injury. Has taken Advil with a little relief with CBD rub. Is not able to take naproxen due to his stomach. ASSESSMENT/ PLAN  1. Anxiety  Trolled, continue Celexa  - citalopram (CELEXA) 40 MG tablet; Take 1 tablet by mouth daily  Dispense: 90 tablet; Refill: 3    2. Strain of left shoulder, initial encounter  Uncontrolled, this is new problem. Likely related to rotator cuff strain or labral impingement. Could also have a component of worsening cervical stenosis. Check shoulder x-ray to assess level of osteoarthritis. Initiate meloxicam and home physical therapy exercises provided. Follow-up if persistent or worsening. Patient may be a good candidate for intra-articular steroid injection. - meloxicam (MOBIC) 15 MG tablet; Take 1 tablet by mouth daily  Dispense: 30 tablet; Refill: 0  - XR SHOULDER LEFT (MIN 2 VIEWS); Future       Return if symptoms worsen or fail to improve. HPI  Patient presents today with concerns of left shoulder pain, anxiety. States that his shoulder pain began about 3 weeks ago. He does have a history of cervical stenosis which causes left upper back pain. He states that this pain is different. He denies injury to the area. He does have radicular symptoms, but they are not worse. Pain is present when he lifts his left arm upwards. It is achy at nighttime. He has been taking Advil as needed without much improvement. No numbness or weakness in his left hand. He does have a history of arthritis in his knees.     Mood is well controlled with Celexa, denies side effects    ROS  Review of Systems   Constitutional:  Negative for activity

## 2023-11-08 ENCOUNTER — OFFICE VISIT (OUTPATIENT)
Dept: PRIMARY CARE CLINIC | Age: 58
End: 2023-11-08
Payer: COMMERCIAL

## 2023-11-08 VITALS
WEIGHT: 204.2 LBS | DIASTOLIC BLOOD PRESSURE: 82 MMHG | HEART RATE: 78 BPM | TEMPERATURE: 97.1 F | SYSTOLIC BLOOD PRESSURE: 120 MMHG | HEIGHT: 68 IN | BODY MASS INDEX: 30.95 KG/M2

## 2023-11-08 DIAGNOSIS — E78.1 HYPERTRIGLYCERIDEMIA: ICD-10-CM

## 2023-11-08 DIAGNOSIS — Z12.2 ENCOUNTER FOR SCREENING FOR MALIGNANT NEOPLASM OF RESPIRATORY ORGANS: ICD-10-CM

## 2023-11-08 DIAGNOSIS — E11.69 DIABETES MELLITUS TYPE 2 IN OBESE (HCC): Primary | ICD-10-CM

## 2023-11-08 DIAGNOSIS — M75.02 ADHESIVE CAPSULITIS OF LEFT SHOULDER: ICD-10-CM

## 2023-11-08 DIAGNOSIS — E66.9 DIABETES MELLITUS TYPE 2 IN OBESE (HCC): Primary | ICD-10-CM

## 2023-11-08 LAB — HBA1C MFR BLD: 6.2 %

## 2023-11-08 PROCEDURE — 99215 OFFICE O/P EST HI 40 MIN: CPT | Performed by: STUDENT IN AN ORGANIZED HEALTH CARE EDUCATION/TRAINING PROGRAM

## 2023-11-08 PROCEDURE — 83036 HEMOGLOBIN GLYCOSYLATED A1C: CPT | Performed by: STUDENT IN AN ORGANIZED HEALTH CARE EDUCATION/TRAINING PROGRAM

## 2023-11-08 PROCEDURE — 3044F HG A1C LEVEL LT 7.0%: CPT | Performed by: STUDENT IN AN ORGANIZED HEALTH CARE EDUCATION/TRAINING PROGRAM

## 2023-11-08 ASSESSMENT — ENCOUNTER SYMPTOMS
SHORTNESS OF BREATH: 0
NAUSEA: 0
WHEEZING: 0

## 2023-11-10 DIAGNOSIS — E66.9 DIABETES MELLITUS TYPE 2 IN OBESE (HCC): ICD-10-CM

## 2023-11-10 DIAGNOSIS — E11.69 DIABETES MELLITUS TYPE 2 IN OBESE (HCC): ICD-10-CM

## 2023-11-10 RX ORDER — DULAGLUTIDE 1.5 MG/.5ML
1.5 INJECTION, SOLUTION SUBCUTANEOUS
Qty: 4 ADJUSTABLE DOSE PRE-FILLED PEN SYRINGE | Refills: 11 | Status: SHIPPED | OUTPATIENT
Start: 2023-11-10

## 2023-11-10 NOTE — TELEPHONE ENCOUNTER
Medication:   Requested Prescriptions     Pending Prescriptions Disp Refills    dulaglutide (TRULICITY) 1.5 SX/8.4GK SC injection 4 Adjustable Dose Pre-filled Pen Syringe 11     Sig: Inject 0.5 mLs into the skin every 7 days        Last Filled:  12/05/2022    Patient Phone Number: [phone] (home)     Last appt: 11/8/2023   Next appt: 2/8/2024    Last OARRS:        No data to display

## 2023-12-11 DIAGNOSIS — E78.1 HYPERTRIGLYCERIDEMIA: ICD-10-CM

## 2023-12-11 DIAGNOSIS — E66.9 DIABETES MELLITUS TYPE 2 IN OBESE (HCC): ICD-10-CM

## 2023-12-11 DIAGNOSIS — E11.69 DIABETES MELLITUS TYPE 2 IN OBESE (HCC): ICD-10-CM

## 2023-12-11 RX ORDER — ATORVASTATIN CALCIUM 40 MG/1
40 TABLET, FILM COATED ORAL DAILY
Qty: 90 TABLET | Refills: 3 | Status: SHIPPED | OUTPATIENT
Start: 2023-12-11

## 2023-12-11 NOTE — TELEPHONE ENCOUNTER
Medication:   Requested Prescriptions     Pending Prescriptions Disp Refills    atorvastatin (LIPITOR) 40 MG tablet 90 tablet 3     Sig: Take 1 tablet by mouth daily        Last Filled:  15/5/22    Patient Phone Number: [phone] (home)     Last appt: 11/8/2023   Next appt: 2/8/2024    Last OARRS:        No data to display

## 2024-01-09 DIAGNOSIS — E11.69 DIABETES MELLITUS TYPE 2 IN OBESE (HCC): ICD-10-CM

## 2024-01-09 DIAGNOSIS — E66.9 DIABETES MELLITUS TYPE 2 IN OBESE (HCC): ICD-10-CM

## 2024-01-10 LAB
ALBUMIN SERPL-MCNC: 5.2 G/DL (ref 3.4–5)
ALBUMIN/GLOB SERPL: 2.3 {RATIO} (ref 1.1–2.2)
ALP SERPL-CCNC: 59 U/L (ref 40–129)
ALT SERPL-CCNC: 57 U/L (ref 10–40)
ANION GAP SERPL CALCULATED.3IONS-SCNC: 14 MMOL/L (ref 3–16)
AST SERPL-CCNC: 31 U/L (ref 15–37)
BILIRUB SERPL-MCNC: 0.8 MG/DL (ref 0–1)
BUN SERPL-MCNC: 14 MG/DL (ref 7–20)
CALCIUM SERPL-MCNC: 9.8 MG/DL (ref 8.3–10.6)
CHLORIDE SERPL-SCNC: 104 MMOL/L (ref 99–110)
CHOLEST SERPL-MCNC: 115 MG/DL (ref 0–199)
CO2 SERPL-SCNC: 26 MMOL/L (ref 21–32)
CREAT SERPL-MCNC: 0.7 MG/DL (ref 0.9–1.3)
GFR SERPLBLD CREATININE-BSD FMLA CKD-EPI: >60 ML/MIN/{1.73_M2}
GLUCOSE SERPL-MCNC: 121 MG/DL (ref 70–99)
HDLC SERPL-MCNC: 50 MG/DL (ref 40–60)
LDLC SERPL CALC-MCNC: 18 MG/DL
POTASSIUM SERPL-SCNC: 4.7 MMOL/L (ref 3.5–5.1)
PROT SERPL-MCNC: 7.5 G/DL (ref 6.4–8.2)
SODIUM SERPL-SCNC: 144 MMOL/L (ref 136–145)
TRIGL SERPL-MCNC: 234 MG/DL (ref 0–150)
VLDLC SERPL CALC-MCNC: 47 MG/DL

## 2024-01-18 DIAGNOSIS — E66.9 DIABETES MELLITUS TYPE 2 IN OBESE (HCC): ICD-10-CM

## 2024-01-18 DIAGNOSIS — E11.69 DIABETES MELLITUS TYPE 2 IN OBESE (HCC): ICD-10-CM

## 2024-01-18 NOTE — TELEPHONE ENCOUNTER
Medication:   Requested Prescriptions     Pending Prescriptions Disp Refills    metFORMIN (GLUCOPHAGE) 500 MG tablet 180 tablet 3     Sig: TAKE 1 TABLET BY MOUTH TWICE DAILY WITH MEALS        Last Filled:  2/6/23    Patient Phone Number: [phone] (home)     Last appt: 11/8/2023   Next appt: 2/8/2024    Last OARRS:        No data to display

## 2024-01-22 DIAGNOSIS — E66.9 DIABETES MELLITUS TYPE 2 IN OBESE (HCC): Primary | ICD-10-CM

## 2024-01-22 DIAGNOSIS — E11.69 DIABETES MELLITUS TYPE 2 IN OBESE (HCC): Primary | ICD-10-CM

## 2024-01-22 RX ORDER — DULAGLUTIDE 0.75 MG/.5ML
0.75 INJECTION, SOLUTION SUBCUTANEOUS WEEKLY
Qty: 2 ML | Refills: 2 | Status: SHIPPED | OUTPATIENT
Start: 2024-01-22

## 2024-02-08 ENCOUNTER — OFFICE VISIT (OUTPATIENT)
Dept: PRIMARY CARE CLINIC | Age: 59
End: 2024-02-08
Payer: COMMERCIAL

## 2024-02-08 VITALS
HEART RATE: 73 BPM | HEIGHT: 68 IN | DIASTOLIC BLOOD PRESSURE: 83 MMHG | BODY MASS INDEX: 31.1 KG/M2 | SYSTOLIC BLOOD PRESSURE: 133 MMHG | TEMPERATURE: 97.7 F | WEIGHT: 205.2 LBS

## 2024-02-08 DIAGNOSIS — K20.90 BARRETT'S ESOPHAGUS WITH ESOPHAGITIS: ICD-10-CM

## 2024-02-08 DIAGNOSIS — E11.69 DIABETES MELLITUS TYPE 2 IN OBESE (HCC): Primary | ICD-10-CM

## 2024-02-08 DIAGNOSIS — K22.70 BARRETT'S ESOPHAGUS WITH ESOPHAGITIS: ICD-10-CM

## 2024-02-08 DIAGNOSIS — E66.9 DIABETES MELLITUS TYPE 2 IN OBESE (HCC): Primary | ICD-10-CM

## 2024-02-08 DIAGNOSIS — E78.1 HYPERTRIGLYCERIDEMIA: ICD-10-CM

## 2024-02-08 LAB — HBA1C MFR BLD: 6.4 %

## 2024-02-08 PROCEDURE — 83036 HEMOGLOBIN GLYCOSYLATED A1C: CPT | Performed by: STUDENT IN AN ORGANIZED HEALTH CARE EDUCATION/TRAINING PROGRAM

## 2024-02-08 PROCEDURE — 99214 OFFICE O/P EST MOD 30 MIN: CPT | Performed by: STUDENT IN AN ORGANIZED HEALTH CARE EDUCATION/TRAINING PROGRAM

## 2024-02-08 PROCEDURE — 3044F HG A1C LEVEL LT 7.0%: CPT | Performed by: STUDENT IN AN ORGANIZED HEALTH CARE EDUCATION/TRAINING PROGRAM

## 2024-02-08 RX ORDER — LANSOPRAZOLE 30 MG/1
30 CAPSULE, DELAYED RELEASE ORAL 2 TIMES DAILY
Qty: 180 CAPSULE | Refills: 3 | Status: SHIPPED | OUTPATIENT
Start: 2024-02-08

## 2024-02-08 SDOH — ECONOMIC STABILITY: FOOD INSECURITY: WITHIN THE PAST 12 MONTHS, THE FOOD YOU BOUGHT JUST DIDN'T LAST AND YOU DIDN'T HAVE MONEY TO GET MORE.: NEVER TRUE

## 2024-02-08 SDOH — ECONOMIC STABILITY: FOOD INSECURITY: WITHIN THE PAST 12 MONTHS, YOU WORRIED THAT YOUR FOOD WOULD RUN OUT BEFORE YOU GOT MONEY TO BUY MORE.: NEVER TRUE

## 2024-02-08 SDOH — ECONOMIC STABILITY: INCOME INSECURITY: HOW HARD IS IT FOR YOU TO PAY FOR THE VERY BASICS LIKE FOOD, HOUSING, MEDICAL CARE, AND HEATING?: NOT HARD AT ALL

## 2024-02-08 ASSESSMENT — ENCOUNTER SYMPTOMS
WHEEZING: 0
NAUSEA: 0
SHORTNESS OF BREATH: 0

## 2024-02-08 ASSESSMENT — PATIENT HEALTH QUESTIONNAIRE - PHQ9
SUM OF ALL RESPONSES TO PHQ QUESTIONS 1-9: 0
2. FEELING DOWN, DEPRESSED OR HOPELESS: 0
SUM OF ALL RESPONSES TO PHQ QUESTIONS 1-9: 0

## 2024-02-08 NOTE — PROGRESS NOTES
[facility]  2024    Eliezer Hayden (:  1965) is a 58 y.o. male, here for evaluation of the following medical concerns:    Chief Complaint   Patient presents with    Diabetes    Hypertension    Medication Refill     Prevacid         ASSESSMENT/ PLAN  1. Diabetes mellitus type 2 in obese (HCC)  Controlled, continue Trulicity and metformin.  Continue statin therapy.  - POCT glycosylated hemoglobin (Hb A1C)    2. Harris's esophagus with esophagitis  Controlled, Prevacid refilled.  - lansoprazole (PREVACID) 30 MG delayed release capsule; Take 1 capsule by mouth in the morning and at bedtime  Dispense: 180 capsule; Refill: 3       Return in about 6 months (around 2024) for annual physical.    HPI  Patient presents today for follow-up on type 2 diabetes, hyperlipidemia, Harris's esophagus.    He has been taking 0.75 mg of Trulicity as there is a shortage of 1.5 mg.  Maintaining good control of his diabetes at this dose.    He has stopped taking omega supplements and fenofibrate, but continues Lipitor for his cholesterol with good control.    ROS  Review of Systems   Constitutional:  Negative for activity change and unexpected weight change.   HENT:  Negative for tinnitus.    Eyes:  Negative for visual disturbance.   Respiratory:  Negative for shortness of breath and wheezing.    Cardiovascular:  Negative for chest pain, palpitations and leg swelling.   Gastrointestinal:  Negative for nausea.   Genitourinary:  Negative for decreased urine volume.   Neurological:  Negative for syncope, light-headedness and headaches.       HISTORIES  Current Outpatient Medications on File Prior to Visit   Medication Sig Dispense Refill    Dulaglutide (TRULICITY) 0.75 MG/0.5ML SOPN Inject 0.75 mg into the skin once a week 2 mL 2    metFORMIN (GLUCOPHAGE) 500 MG tablet TAKE 1 TABLET BY MOUTH TWICE DAILY WITH MEALS 180 tablet 3    atorvastatin (LIPITOR) 40 MG tablet Take 1 tablet by mouth daily 90 tablet 3

## 2024-04-09 DIAGNOSIS — E11.69 TYPE 2 DIABETES MELLITUS WITH OBESITY (HCC): ICD-10-CM

## 2024-04-09 DIAGNOSIS — E66.9 TYPE 2 DIABETES MELLITUS WITH OBESITY (HCC): ICD-10-CM

## 2024-04-09 RX ORDER — DULAGLUTIDE 0.75 MG/.5ML
INJECTION, SOLUTION SUBCUTANEOUS
Qty: 2 ML | Refills: 2 | Status: SHIPPED | OUTPATIENT
Start: 2024-04-09

## 2024-04-09 NOTE — TELEPHONE ENCOUNTER
Medication:   Requested Prescriptions     Pending Prescriptions Disp Refills    TRULICITY 0.75 MG/0.5ML SOPN [Pharmacy Med Name: TRULICITY 0.75 MG/0.5 ML PEN]  2     Sig: INJECT 0.75 MG SUBCUTANEOUSLY ONE TIME PER WEEK        Last Filled:  01/22/24    Patient Phone Number: [phone] (home)     Last appt: 2/8/2024   Return in about 6 months (around 8/8/2024) for annual physical.  Next appt: 8/8/2024    Last OARRS:        No data to display

## 2024-05-17 ENCOUNTER — TELEPHONE (OUTPATIENT)
Dept: PRIMARY CARE CLINIC | Age: 59
End: 2024-05-17

## 2024-05-17 NOTE — TELEPHONE ENCOUNTER
How Severe Is Your Rash?: moderate Pt called in having questions about a message he sent in on 5/14/2024 about his medications. The message was sent to [facility]imiro awaiting a response. I informed the pt the message was sent to the doctor the same day he sent it in. He then began to say someone told him this would be resolved with in 48 hours. I then told the pt its 48 hours for us to respond to a message. He stated I sent something about this in last week. Also my pharmacy called in. I stated every time someone calls us our staff documents it and sends it to the pool. I informed him we have not received anything since 4/9/2024 was the last time something was put in his chart.  But your message was escalated to the prior authorization department which can take up to 7 days or less because it is a process. He then asked what is the prior authoraztion department number I told him they do not have a number we contact them through epic. He asked what is epic I informed him what is was. He then wanted a number to customer service to escalate the issue because he feels like no one is taking him seriously. I told him I'm not sure who he would call for that but I can have my  him Monday. He then asked what my name was I told him Jess MA. He then followed up with the question so how do we talk to the pa department if they have no number? I informed the pt I already told you we contact them through epic which is on our computer. He then proceeded to be rude and aggressive and calling me out my name. He then stated  \"you people at that office are mother curse word idiots do you hear me? Stupid curse word idiots\".I then stated our  will call you on Monday and  to have a good day and I ended the call.    Is This A New Presentation, Or A Follow-Up?: Rash

## 2024-05-20 ENCOUNTER — TELEPHONE (OUTPATIENT)
Dept: PRIMARY CARE CLINIC | Age: 59
End: 2024-05-20

## 2024-05-20 NOTE — TELEPHONE ENCOUNTER
Dulaglutide (TRULICITY) 0.75 MG/0.5ML SOPN [[id number]]    Order Details  Dose, Route, Frequency: As Directed   Dispense Quantity: 2 mL Refills: 2          Sig: INJECT 0.75 MG SUBCUTANEOUSLY ONE TIME PER WEEK         Start Date: 04/09/24 End Date: --   Written Date: 04/09/24 Expiration Date: 04/09/25   Original Order: Dulaglutide (TRULICITY) 0.75 MG/0.5ML SOPN [[id number]]   Providers    Authorizing Provider: Nathan Nails DO  NPI: [id number]   Ordering User: Nathan Nails DO          Pharmacy    [facility]/pharmacy #[id number] - [place], [place] - [address] - P [phone] - F [phone] [address] [address]  Phone: [phone]  Fax: [phone]

## 2024-05-20 NOTE — TELEPHONE ENCOUNTER
Submitted PA for Trulicity 0.75MG/0.5ML pen-injectors  Via CMM Key: [id number] STATUS: PENDING/MARKED AS URGENT    Follow up done daily; if no decision with in three days we will refax.  If another three days goes by with no decision will call the insurance for status.

## 2024-05-21 NOTE — TELEPHONE ENCOUNTER
APPROVAL for Trulicity 0.75MG/0.5ML pen-injectors through 05/20/2025; letter attached.    If this requires a response please respond to the pool ( P [facility] MEDICATION PRE-AUTH).      Thank you please advise patient.

## 2024-07-08 RX ORDER — DULAGLUTIDE 0.75 MG/.5ML
INJECTION, SOLUTION SUBCUTANEOUS
Qty: 2 ML | Refills: 2 | Status: SHIPPED | OUTPATIENT
Start: 2024-07-08

## 2024-07-08 NOTE — TELEPHONE ENCOUNTER
Medication:   Requested Prescriptions     Pending Prescriptions Disp Refills    TRULICITY 0.75 MG/0.5ML SOPN SC injection [Pharmacy Med Name: TRULICITY 0.75 MG/0.5 ML PEN] 2 mL 2     Sig: INJECT 0.75 MG SUBCUTANEOUSLY ONE TIME PER WEEK        Last Filled:  04/09/24    Patient Phone Number: [phone] (home)     Last appt: 2/8/2024   Return in about 6 months (around 8/8/2024) for annual physical.  Next appt: 8/8/2024    Last OARRS:        No data to display

## 2024-07-30 DIAGNOSIS — F41.9 ANXIETY: ICD-10-CM

## 2024-07-30 RX ORDER — CITALOPRAM 40 MG/1
40 TABLET ORAL DAILY
Qty: 90 TABLET | Refills: 3 | Status: SHIPPED | OUTPATIENT
Start: 2024-07-30

## 2024-07-30 NOTE — TELEPHONE ENCOUNTER
Medication:   Requested Prescriptions     Pending Prescriptions Disp Refills    citalopram (CELEXA) 40 MG tablet [Pharmacy Med Name: CITALOPRAM HBR 40 MG TABLET] 90 tablet 3     Sig: TAKE 1 TABLET BY MOUTH EVERY DAY        Last Filled:  8/8/23    Patient Phone Number: [phone] (home)     Last appt: 2/8/2024   Next appt: 8/8/2024       Return in about 6 months (around 8/8/2024) for annual physical.

## 2024-09-11 LAB — POC CREATININE: 0.9 MG/DL (ref 0.5–1.3)
